# Patient Record
Sex: FEMALE | Race: BLACK OR AFRICAN AMERICAN | NOT HISPANIC OR LATINO | Employment: FULL TIME | URBAN - METROPOLITAN AREA
[De-identification: names, ages, dates, MRNs, and addresses within clinical notes are randomized per-mention and may not be internally consistent; named-entity substitution may affect disease eponyms.]

---

## 2021-01-18 ENCOUNTER — TRANSCRIBE ORDERS (OUTPATIENT)
Dept: LAB | Facility: HOSPITAL | Age: 26
End: 2021-01-18

## 2021-01-18 ENCOUNTER — LAB (OUTPATIENT)
Dept: LAB | Facility: HOSPITAL | Age: 26
End: 2021-01-18

## 2021-01-18 DIAGNOSIS — Z32.00 PREGNANCY EXAMINATION OR TEST, PREGNANCY UNCONFIRMED: Primary | ICD-10-CM

## 2021-01-18 DIAGNOSIS — Z32.00 PREGNANCY EXAMINATION OR TEST, PREGNANCY UNCONFIRMED: ICD-10-CM

## 2021-01-18 LAB
ALP SERPL-CCNC: 63 U/L (ref 39–117)
ALT SERPL W P-5'-P-CCNC: 13 U/L (ref 1–33)
AST SERPL-CCNC: 21 U/L (ref 1–32)
BILIRUB SERPL-MCNC: 0.3 MG/DL (ref 0–1.2)
CREAT SERPL-MCNC: 0.69 MG/DL (ref 0.57–1)
HCG INTACT+B SERPL-ACNC: NORMAL MIU/ML
LDH SERPL-CCNC: 194 U/L (ref 135–214)
PROGEST SERPL-MCNC: 12.7 NG/ML
URATE SERPL-MCNC: 3.2 MG/DL (ref 2.4–5.7)

## 2021-01-18 PROCEDURE — 84460 ALANINE AMINO (ALT) (SGPT): CPT

## 2021-01-18 PROCEDURE — 82247 BILIRUBIN TOTAL: CPT

## 2021-01-18 PROCEDURE — 84702 CHORIONIC GONADOTROPIN TEST: CPT

## 2021-01-18 PROCEDURE — 82565 ASSAY OF CREATININE: CPT

## 2021-01-18 PROCEDURE — 36415 COLL VENOUS BLD VENIPUNCTURE: CPT

## 2021-01-18 PROCEDURE — 83615 LACTATE (LD) (LDH) ENZYME: CPT

## 2021-01-18 PROCEDURE — 84550 ASSAY OF BLOOD/URIC ACID: CPT

## 2021-01-18 PROCEDURE — 84075 ASSAY ALKALINE PHOSPHATASE: CPT

## 2021-01-18 PROCEDURE — 84450 TRANSFERASE (AST) (SGOT): CPT

## 2021-01-18 PROCEDURE — 84144 ASSAY OF PROGESTERONE: CPT

## 2021-02-12 ENCOUNTER — LAB (OUTPATIENT)
Dept: LAB | Facility: HOSPITAL | Age: 26
End: 2021-02-12

## 2021-02-12 ENCOUNTER — TRANSCRIBE ORDERS (OUTPATIENT)
Dept: LAB | Facility: HOSPITAL | Age: 26
End: 2021-02-12

## 2021-02-12 DIAGNOSIS — Z34.81 PRENATAL CARE, SUBSEQUENT PREGNANCY, FIRST TRIMESTER: ICD-10-CM

## 2021-02-12 DIAGNOSIS — Z3A.11 11 WEEKS GESTATION OF PREGNANCY: ICD-10-CM

## 2021-02-12 DIAGNOSIS — Z3A.11 11 WEEKS GESTATION OF PREGNANCY: Primary | ICD-10-CM

## 2021-02-12 LAB
ABO GROUP BLD: NORMAL
AMPHET+METHAMPHET UR QL: NEGATIVE
AMPHETAMINES UR QL: NEGATIVE
BARBITURATES UR QL SCN: NEGATIVE
BASOPHILS # BLD AUTO: 0.04 10*3/MM3 (ref 0–0.2)
BASOPHILS NFR BLD AUTO: 0.4 % (ref 0–1.5)
BENZODIAZ UR QL SCN: NEGATIVE
BILIRUB UR QL STRIP: NEGATIVE
BLD GP AB SCN SERPL QL: NEGATIVE
BUPRENORPHINE SERPL-MCNC: NEGATIVE NG/ML
CANNABINOIDS SERPL QL: POSITIVE
CLARITY UR: ABNORMAL
COCAINE UR QL: NEGATIVE
COLOR UR: YELLOW
DEPRECATED RDW RBC AUTO: 45.5 FL (ref 37–54)
EOSINOPHIL # BLD AUTO: 0.05 10*3/MM3 (ref 0–0.4)
EOSINOPHIL NFR BLD AUTO: 0.5 % (ref 0.3–6.2)
ERYTHROCYTE [DISTWIDTH] IN BLOOD BY AUTOMATED COUNT: 13.8 % (ref 12.3–15.4)
GLUCOSE SERPL-MCNC: 77 MG/DL (ref 65–99)
GLUCOSE UR STRIP-MCNC: NEGATIVE MG/DL
HBV SURFACE AG SERPL QL IA: NORMAL
HCT VFR BLD AUTO: 39.8 % (ref 34–46.6)
HCV AB SER DONR QL: NORMAL
HGB BLD-MCNC: 12.7 G/DL (ref 12–15.9)
HGB UR QL STRIP.AUTO: NEGATIVE
HIV1+2 AB SER QL: NORMAL
IMM GRANULOCYTES # BLD AUTO: 0.03 10*3/MM3 (ref 0–0.05)
IMM GRANULOCYTES NFR BLD AUTO: 0.3 % (ref 0–0.5)
KETONES UR QL STRIP: ABNORMAL
LEUKOCYTE ESTERASE UR QL STRIP.AUTO: NEGATIVE
LYMPHOCYTES # BLD AUTO: 1.68 10*3/MM3 (ref 0.7–3.1)
LYMPHOCYTES NFR BLD AUTO: 18.1 % (ref 19.6–45.3)
MCH RBC QN AUTO: 28.8 PG (ref 26.6–33)
MCHC RBC AUTO-ENTMCNC: 31.9 G/DL (ref 31.5–35.7)
MCV RBC AUTO: 90.2 FL (ref 79–97)
METHADONE UR QL SCN: NEGATIVE
MONOCYTES # BLD AUTO: 0.64 10*3/MM3 (ref 0.1–0.9)
MONOCYTES NFR BLD AUTO: 6.9 % (ref 5–12)
NEUTROPHILS NFR BLD AUTO: 6.83 10*3/MM3 (ref 1.7–7)
NEUTROPHILS NFR BLD AUTO: 73.8 % (ref 42.7–76)
NITRITE UR QL STRIP: NEGATIVE
NRBC BLD AUTO-RTO: 0 /100 WBC (ref 0–0.2)
OPIATES UR QL: NEGATIVE
OXYCODONE UR QL SCN: NEGATIVE
PCP UR QL SCN: NEGATIVE
PH UR STRIP.AUTO: 7.5 [PH] (ref 5–8)
PLATELET # BLD AUTO: 328 10*3/MM3 (ref 140–450)
PMV BLD AUTO: 9.8 FL (ref 6–12)
PROPOXYPH UR QL: NEGATIVE
PROT UR QL STRIP: ABNORMAL
RBC # BLD AUTO: 4.41 10*6/MM3 (ref 3.77–5.28)
RH BLD: POSITIVE
RPR SER QL: NORMAL
RUBV IGG SERPL IA-ACNC: POSITIVE
SP GR UR STRIP: 1.03 (ref 1–1.03)
TRICYCLICS UR QL SCN: NEGATIVE
TSH SERPL DL<=0.05 MIU/L-ACNC: 0.45 UIU/ML (ref 0.27–4.2)
UROBILINOGEN UR QL STRIP: ABNORMAL
WBC # BLD AUTO: 9.27 10*3/MM3 (ref 3.4–10.8)

## 2021-02-12 PROCEDURE — 80081 OBSTETRIC PANEL INC HIV TSTG: CPT

## 2021-02-12 PROCEDURE — 81003 URINALYSIS AUTO W/O SCOPE: CPT

## 2021-02-12 PROCEDURE — 80306 DRUG TEST PRSMV INSTRMNT: CPT

## 2021-02-12 PROCEDURE — 82947 ASSAY GLUCOSE BLOOD QUANT: CPT

## 2021-02-12 PROCEDURE — 86803 HEPATITIS C AB TEST: CPT

## 2021-02-12 PROCEDURE — 36415 COLL VENOUS BLD VENIPUNCTURE: CPT

## 2021-02-12 PROCEDURE — 84443 ASSAY THYROID STIM HORMONE: CPT

## 2021-04-29 ENCOUNTER — HOSPITAL ENCOUNTER (OUTPATIENT)
Facility: HOSPITAL | Age: 26
Discharge: HOME | End: 2021-04-29
Attending: OBSTETRICS & GYNECOLOGY | Admitting: OBSTETRICS & GYNECOLOGY

## 2021-04-29 ENCOUNTER — HOSPITAL ENCOUNTER (OUTPATIENT)
Facility: HOSPITAL | Age: 26
Setting detail: SURGERY ADMIT
End: 2021-04-29
Attending: OBSTETRICS & GYNECOLOGY | Admitting: OBSTETRICS & GYNECOLOGY

## 2021-04-29 VITALS
SYSTOLIC BLOOD PRESSURE: 153 MMHG | WEIGHT: 174 LBS | RESPIRATION RATE: 16 BRPM | DIASTOLIC BLOOD PRESSURE: 94 MMHG | BODY MASS INDEX: 30.83 KG/M2 | HEIGHT: 63 IN | HEART RATE: 89 BPM

## 2021-04-29 PROCEDURE — 99203 OFFICE O/P NEW LOW 30 MIN: CPT | Performed by: OBSTETRICS & GYNECOLOGY

## 2021-04-29 PROCEDURE — G0463 HOSPITAL OUTPT CLINIC VISIT: HCPCS

## 2021-04-29 RX ORDER — ASPIRIN 81 MG/1
81 TABLET, CHEWABLE ORAL DAILY
COMMUNITY
End: 2022-04-11

## 2021-04-29 RX ORDER — PRENATAL WITH FERROUS FUM AND FOLIC ACID 3080; 920; 120; 400; 22; 1.84; 3; 20; 10; 1; 12; 200; 27; 25; 2 [IU]/1; [IU]/1; MG/1; [IU]/1; MG/1; MG/1; MG/1; MG/1; MG/1; MG/1; UG/1; MG/1; MG/1; MG/1; MG/1
TABLET ORAL DAILY
COMMUNITY
End: 2022-04-11

## 2021-08-23 ENCOUNTER — PREP FOR SURGERY (OUTPATIENT)
Dept: OTHER | Facility: HOSPITAL | Age: 26
End: 2021-08-23

## 2021-08-23 DIAGNOSIS — Z34.90 TERM PREGNANCY: Primary | ICD-10-CM

## 2021-08-23 RX ORDER — CEFAZOLIN SODIUM 2 G/100ML
2 INJECTION, SOLUTION INTRAVENOUS ONCE
Status: CANCELLED | OUTPATIENT
Start: 2021-08-23 | End: 2021-08-23

## 2021-08-23 RX ORDER — OXYTOCIN-SODIUM CHLORIDE 0.9% IV SOLN 30 UNIT/500ML 30-0.9/5 UT/ML-%
85 SOLUTION INTRAVENOUS ONCE
Status: CANCELLED | OUTPATIENT
Start: 2021-08-23 | End: 2021-08-23

## 2021-08-23 RX ORDER — SODIUM CHLORIDE 0.9 % (FLUSH) 0.9 %
3-10 SYRINGE (ML) INJECTION AS NEEDED
Status: CANCELLED | OUTPATIENT
Start: 2021-08-23

## 2021-08-23 RX ORDER — CARBOPROST TROMETHAMINE 250 UG/ML
250 INJECTION, SOLUTION INTRAMUSCULAR AS NEEDED
Status: CANCELLED | OUTPATIENT
Start: 2021-08-23

## 2021-08-23 RX ORDER — KETOROLAC TROMETHAMINE 30 MG/ML
30 INJECTION, SOLUTION INTRAMUSCULAR; INTRAVENOUS ONCE
Status: CANCELLED | OUTPATIENT
Start: 2021-08-23 | End: 2021-08-23

## 2021-08-23 RX ORDER — SODIUM CHLORIDE, SODIUM LACTATE, POTASSIUM CHLORIDE, CALCIUM CHLORIDE 600; 310; 30; 20 MG/100ML; MG/100ML; MG/100ML; MG/100ML
125 INJECTION, SOLUTION INTRAVENOUS CONTINUOUS
Status: CANCELLED | OUTPATIENT
Start: 2021-08-23

## 2021-08-23 RX ORDER — ACETAMINOPHEN 500 MG
1000 TABLET ORAL ONCE
Status: CANCELLED | OUTPATIENT
Start: 2021-08-23 | End: 2021-08-23

## 2021-08-23 RX ORDER — OXYTOCIN-SODIUM CHLORIDE 0.9% IV SOLN 30 UNIT/500ML 30-0.9/5 UT/ML-%
650 SOLUTION INTRAVENOUS ONCE
Status: CANCELLED | OUTPATIENT
Start: 2021-08-23 | End: 2021-08-23

## 2021-08-23 RX ORDER — SODIUM CHLORIDE 0.9 % (FLUSH) 0.9 %
3 SYRINGE (ML) INJECTION EVERY 12 HOURS SCHEDULED
Status: CANCELLED | OUTPATIENT
Start: 2021-08-23

## 2021-08-23 RX ORDER — MISOPROSTOL 200 UG/1
800 TABLET ORAL AS NEEDED
Status: CANCELLED | OUTPATIENT
Start: 2021-08-23

## 2021-08-23 RX ORDER — TRISODIUM CITRATE DIHYDRATE AND CITRIC ACID MONOHYDRATE 500; 334 MG/5ML; MG/5ML
30 SOLUTION ORAL ONCE
Status: CANCELLED | OUTPATIENT
Start: 2021-08-23 | End: 2021-08-23

## 2021-08-23 RX ORDER — ONDANSETRON 4 MG/1
4 TABLET, FILM COATED ORAL EVERY 6 HOURS PRN
Status: CANCELLED | OUTPATIENT
Start: 2021-08-23

## 2021-08-23 RX ORDER — ONDANSETRON 2 MG/ML
4 INJECTION INTRAMUSCULAR; INTRAVENOUS EVERY 6 HOURS PRN
Status: CANCELLED | OUTPATIENT
Start: 2021-08-23

## 2021-08-23 RX ORDER — LIDOCAINE HYDROCHLORIDE 10 MG/ML
5 INJECTION, SOLUTION EPIDURAL; INFILTRATION; INTRACAUDAL; PERINEURAL AS NEEDED
Status: CANCELLED | OUTPATIENT
Start: 2021-08-23

## 2021-08-23 RX ORDER — OXYCODONE HYDROCHLORIDE AND ACETAMINOPHEN 5; 325 MG/1; MG/1
1 TABLET ORAL EVERY 4 HOURS PRN
Status: CANCELLED | OUTPATIENT
Start: 2021-08-23 | End: 2021-09-02

## 2021-08-23 RX ORDER — METHYLERGONOVINE MALEATE 0.2 MG/ML
200 INJECTION INTRAVENOUS ONCE AS NEEDED
Status: CANCELLED | OUTPATIENT
Start: 2021-08-23

## 2021-08-24 ENCOUNTER — APPOINTMENT (OUTPATIENT)
Dept: PREADMISSION TESTING | Facility: HOSPITAL | Age: 26
End: 2021-08-24

## 2021-09-05 PROCEDURE — U0004 COV-19 TEST NON-CDC HGH THRU: HCPCS | Performed by: NURSE PRACTITIONER

## 2021-10-04 PROCEDURE — U0004 COV-19 TEST NON-CDC HGH THRU: HCPCS | Performed by: FAMILY MEDICINE

## 2021-10-08 ENCOUNTER — HOSPITAL ENCOUNTER (EMERGENCY)
Facility: HOSPITAL | Age: 26
Discharge: LEFT BEFORE DISCHARGE | End: 2021-10-08

## 2021-10-08 VITALS
OXYGEN SATURATION: 99 % | WEIGHT: 190 LBS | DIASTOLIC BLOOD PRESSURE: 106 MMHG | BODY MASS INDEX: 34.96 KG/M2 | TEMPERATURE: 98.6 F | RESPIRATION RATE: 16 BRPM | SYSTOLIC BLOOD PRESSURE: 150 MMHG | HEIGHT: 62 IN | HEART RATE: 130 BPM

## 2021-10-08 PROCEDURE — 99211 OFF/OP EST MAY X REQ PHY/QHP: CPT

## 2022-04-11 ENCOUNTER — OFFICE VISIT (OUTPATIENT)
Dept: INTERNAL MEDICINE | Facility: CLINIC | Age: 27
End: 2022-04-11

## 2022-04-11 VITALS
TEMPERATURE: 97.6 F | HEART RATE: 74 BPM | WEIGHT: 186 LBS | RESPIRATION RATE: 17 BRPM | SYSTOLIC BLOOD PRESSURE: 124 MMHG | BODY MASS INDEX: 32.96 KG/M2 | DIASTOLIC BLOOD PRESSURE: 80 MMHG | HEIGHT: 63 IN

## 2022-04-11 DIAGNOSIS — Z13.220 LIPID SCREENING: ICD-10-CM

## 2022-04-11 DIAGNOSIS — I10 PRIMARY HYPERTENSION: Primary | ICD-10-CM

## 2022-04-11 PROCEDURE — 99204 OFFICE O/P NEW MOD 45 MIN: CPT | Performed by: INTERNAL MEDICINE

## 2022-04-11 NOTE — PROGRESS NOTES
"Chief Complaint  Establish Care    Subjective    Virginia Pandey is a 26 y.o. female.     Virginia Pandey presents to [facility] for       History of Present Illness    The following portions of the patient's history were reviewed and updated as appropriate: allergies, current medications, past family history, past medical history, past social history, past surgical history and problem list.    Establishment     1 HTN- chronic and controlled.  Patient denies any shortness of breath, chest pain, nausea, vomiting, headache and fatigue, or any other systemic symptoms.  She says that she has been compliant with her blood pressure medications and has had no issues.  She does have questions and concerns in regards to how to manage her medications in regards to her blood pressure        Past Medical History   Pre eclempsia - Patient was treated with Labelalol throughout her pregnancy    Hydradenitis Suppurativa- treated with Humara       Family History   Hypertension       Social History: No EtOH consumption, no IV drug use, no marijuana, no illicit drugs.        Review of Systems   All other systems reviewed and are negative.      Objective   Vital Signs:   /80   Pulse 74   Temp 97.6 °F (36.4 °C)   Resp 17   Ht 160 cm (63\")   Wt 84.4 kg (186 lb)   BMI 32.95 kg/m²     Body mass index is 32.95 kg/m².    Physical Exam  Vitals and nursing note reviewed.   Constitutional:       Appearance: Normal appearance. She is normal weight.   HENT:      Head: Normocephalic and atraumatic.      Right Ear: Tympanic membrane, ear canal and external ear normal.      Left Ear: Tympanic membrane, ear canal and external ear normal.      Nose: Nose normal.      Mouth/Throat:      Mouth: Mucous membranes are moist.   Eyes:      Pupils: Pupils are equal, round, and reactive to light.   Cardiovascular:      Rate and Rhythm: Normal rate.      Pulses: Normal pulses.   Pulmonary:      Effort: " Pulmonary effort is normal.   Musculoskeletal:      Cervical back: Normal range of motion and neck supple.   Skin:     General: Skin is warm.      Capillary Refill: Capillary refill takes less than 2 seconds.   Neurological:      General: No focal deficit present.      Mental Status: She is alert.   Psychiatric:         Mood and Affect: Mood normal.         Behavior: Behavior normal.         Thought Content: Thought content normal.         Judgment: Judgment normal.               Assessment and Plan  Diagnoses and all orders for this visit:    Diagnoses and all orders for this visit:    1. Primary hypertension (Primary)  -     CBC (No Diff); Future  -     Comprehensive Metabolic Panel; Future  -     T4, Free; Future  -     TSH; Future    Patient has been off of her spironolactone 50 mg 1 tablet by mouth twice a day-provided blood pressure log sheet for her and we will continue to monitor blood pressures with recording readings for me to review    2. Lipid screening  -     Lipid Panel; Future

## 2022-06-08 ENCOUNTER — OFFICE VISIT (OUTPATIENT)
Dept: INTERNAL MEDICINE | Facility: CLINIC | Age: 27
End: 2022-06-08

## 2022-06-08 VITALS
DIASTOLIC BLOOD PRESSURE: 80 MMHG | TEMPERATURE: 98.7 F | HEART RATE: 70 BPM | SYSTOLIC BLOOD PRESSURE: 120 MMHG | RESPIRATION RATE: 20 BRPM | OXYGEN SATURATION: 99 % | BODY MASS INDEX: 31.89 KG/M2 | WEIGHT: 180 LBS

## 2022-06-08 DIAGNOSIS — B00.9 HSV INFECTION: ICD-10-CM

## 2022-06-08 DIAGNOSIS — K13.0 CHEILITIS: Primary | ICD-10-CM

## 2022-06-08 PROCEDURE — 99214 OFFICE O/P EST MOD 30 MIN: CPT | Performed by: INTERNAL MEDICINE

## 2022-06-08 RX ORDER — ACYCLOVIR 400 MG/1
400 TABLET ORAL 3 TIMES DAILY
Qty: 15 TABLET | Refills: 1 | OUTPATIENT
Start: 2022-06-08 | End: 2023-01-18

## 2022-06-08 NOTE — PROGRESS NOTES
Chief Complaint  Rash    Subjective    Virginia Pandey is a 26 y.o. female.     Virginia Pandey presents to [facility] for       History of Present Illness    The patient first noted that her glands were swollen on 05/28/2022. The rash has now spread all the way to her eye. The patient wonders if the rash could have been caused by her Humira. Her third injection of Humira was on 05/26/2022. The patient notes that the rash started out as what she thought was a hidradenitis suppurativa, but after 2 weeks, the hidradenitis suppurativa kept enlarging. When the hidradenitis suppurativa started reducing was when the blotchy rash appeared on her face. She denies any pain with the lesion, she just experiences a hot sensation. She does experience pruritis with the rash occasionally.  The patient tries not to scratch it, but sometimes she can not help it. She notes that she was told that the Humira weakened her immune system. She denies utilizing any topical solutions. The patient denies any fever, chills or influenza-like symptoms; however, she does admit to a scratchy throat. 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 She states that she has experienced hidradenitis suppurativa since she was a child. She administers Humira every 14 days, and her 4th dose is scheduled for tomorrow. She reports that she has not had a flare up since starting the Humira. The patient notes that she has some good days and some bad days.    The following portions of the patient's history were reviewed and updated as appropriate: allergies, current medications, past family history, past medical history, past social history, past surgical history and problem list.    Review of Systems   A review of systems was performed, and the pertinent positives are noted in the HPI.      Objective   Vital Signs:   /80 (BP Location: Right arm,  Patient Position: Sitting, Cuff Size: Adult)   Pulse 70   Temp 98.7 °F (37.1 °C) (Temporal)   Resp 20   Wt 81.6 kg (180 lb)   SpO2 99%   BMI 31.89 kg/m²     Body mass index is 31.89 kg/m².    Physical Exam  Constitutional:       Appearance: Normal appearance.      Comments: Very focused physical with the patient with focus on the skin.   HENT:      Head: Normocephalic and atraumatic.      Mouth/Throat:      Mouth: Mucous membranes are moist.      Pharynx: No oropharyngeal exudate or posterior oropharyngeal erythema.      Comments: On the mouth, she does have a small angular cheilitis in the medial aspect of her right angular area on her mouth with diffuse papular rash on the face with one small ulcerated lesion in the nasal nare on the right side. She also has a small palpable lymph node on the right side as well, nontender.  Eyes:      Extraocular Movements: Extraocular movements intact.      Pupils: Pupils are equal, round, and reactive to light.   Neurological:      Mental Status: She is alert.               Assessment and Plan  Diagnoses and all orders for this visit:    Spent 35 minutes face-to-face with patient    Cheilitis/HSV infection.  I have recommended that she hold her Humira infusion therapy for the treatment of her hidradenitis suppurativa. The fact that this could possibly put her in immunosuppressed state and cause the infection to spread, so we are going to hold off on the Humira for approximately 1 week. I am going to start her on acyclovir 400 mg 3 times a day for 5 days with a refill just in case we have to repeat. Patient will contact me after treatment to let me know how her symptoms are doing in correlation with her rash to assess resolution or worsening of the rash. At that time, we will make a decision.            Follow Up   No follow-ups on file.  Patient was given instructions and counseling regarding her condition or for health maintenance advice. Please see specific information  pulled into the AVS if appropriate.    Transcribed from ambient dictation for James Velez MD by ALLYSON PORRAS.  06/08/22   17:42 EDT    Patient verbalized consent to the visit recording.  I have personally performed the services described in this document as transcribed by the above individual, and it is both accurate and complete.  James Velez MD  6/14/2022  21:26 EDT

## 2022-11-07 ENCOUNTER — PATIENT MESSAGE (OUTPATIENT)
Dept: INTERNAL MEDICINE | Facility: CLINIC | Age: 27
End: 2022-11-07

## 2023-01-18 PROCEDURE — 87077 CULTURE AEROBIC IDENTIFY: CPT | Performed by: FAMILY MEDICINE

## 2023-01-18 PROCEDURE — 87086 URINE CULTURE/COLONY COUNT: CPT | Performed by: FAMILY MEDICINE

## 2023-01-18 PROCEDURE — 87186 SC STD MICRODIL/AGAR DIL: CPT | Performed by: FAMILY MEDICINE

## 2023-03-08 ENCOUNTER — PATIENT MESSAGE (OUTPATIENT)
Dept: INTERNAL MEDICINE | Facility: CLINIC | Age: 28
End: 2023-03-08
Payer: COMMERCIAL

## 2023-03-09 NOTE — TELEPHONE ENCOUNTER
From: Virginia Pandey  To: James Velez  Sent: 3/8/2023 5:35 PM EST  Subject: Lorena Troncoso I’m at the pharmacy and they said they don’t have a prescription or anything for her and I’ve called the other two [facility] and they have all said they have nothing.

## 2023-03-26 ENCOUNTER — PATIENT MESSAGE (OUTPATIENT)
Dept: INTERNAL MEDICINE | Facility: CLINIC | Age: 28
End: 2023-03-26
Payer: COMMERCIAL

## 2023-03-27 NOTE — TELEPHONE ENCOUNTER
From: Virginia Pandey  To: James Velez  Sent: 3/26/2023 9:43 PM EDT  Subject: Lorena Pandeyananda [birth year]    Aba Robbins are you?   I was wondering could Lorena be worked in tomorrow it is URGENT, she has still had a really nasty cough even after antibiotics and she struggles with the cough its horrible. Im worried I know mama said you would run prior test on her if needed. Please let me know thank you!

## 2023-12-15 ENCOUNTER — HOSPITAL ENCOUNTER (EMERGENCY)
Facility: HOSPITAL | Age: 28
Discharge: HOME | End: 2023-12-15
Attending: EMERGENCY MEDICINE
Payer: COMMERCIAL

## 2023-12-15 VITALS
RESPIRATION RATE: 18 BRPM | DIASTOLIC BLOOD PRESSURE: 108 MMHG | OXYGEN SATURATION: 98 % | HEART RATE: 115 BPM | TEMPERATURE: 98.1 F | WEIGHT: 192 LBS | BODY MASS INDEX: 34.02 KG/M2 | HEIGHT: 63 IN | SYSTOLIC BLOOD PRESSURE: 119 MMHG

## 2023-12-15 DIAGNOSIS — K02.9 PAIN DUE TO DENTAL CARIES: Primary | ICD-10-CM

## 2023-12-15 PROCEDURE — 99282 EMERGENCY DEPT VISIT SF MDM: CPT

## 2023-12-15 RX ORDER — PENICILLIN V POTASSIUM 500 MG/1
500 TABLET ORAL 4 TIMES DAILY
Qty: 40 TABLET | Refills: 0 | Status: SHIPPED | OUTPATIENT
Start: 2023-12-15

## 2023-12-15 NOTE — ED PROVIDER NOTES
Subjective   History of Present Illness  28-year-old female presents emergency department today with dental pain.  She complains of the last lower molar on the left causing pain.  She has a dentist who has been trying to save this tooth but apparently has gotten worse.  She reports she has had no fevers no chills but having pain up into the ear.  No drainage from the ear no hearing loss.  Her dentist apparently now cannot get her in to fix this.    History provided by:  Patient   used: No    Dental Pain  Location:  Lower  Lower teeth location:  32/RL 3rd molar  Quality:  Throbbing and dull  Severity:  Severe  Onset quality:  Gradual  Duration:  2 days  Timing:  Constant  Progression:  Worsening  Chronicity:  New  Context: abscess    Previous work-up:  Dental exam  Relieved by:  Nothing  Worsened by:  Nothing  Ineffective treatments:  None tried  Associated symptoms: no congestion, no drooling, no facial pain, no facial swelling, no fever, no gum swelling, no neck pain, no oral lesions and no trismus    Risk factors: no alcohol problem, no diabetes, no immunosuppression and sufficient dental care        Review of Systems   Constitutional:  Negative for chills and fever.   HENT:  Negative for congestion, drooling, facial swelling and mouth sores.    Respiratory:  Negative for chest tightness, shortness of breath and wheezing.    Cardiovascular:  Negative for chest pain and palpitations.   Musculoskeletal:  Negative for neck pain.   Psychiatric/Behavioral: Negative.         Past Medical History:   Diagnosis Date    Hidradenitis suppurativa     Hydradenitis     Hypertension     [place] teeth extracted        No Known Allergies    Past Surgical History:   Procedure Laterality Date     SECTION         Family History   Problem Relation Age of Onset    Asthma Mother        Social History     Socioeconomic History    Marital status: Single   Tobacco Use    Smoking status: Never    Smokeless  "tobacco: Never   Vaping Use    Vaping Use: Never used   Substance and Sexual Activity    Alcohol use: Yes     Comment: Daily    Drug use: Defer    Sexual activity: Defer           Objective   Physical Exam  Vitals and nursing note reviewed.   Constitutional:       General: She is not in acute distress.     Appearance: She is well-developed. She is not diaphoretic.   HENT:      Head: Normocephalic and atraumatic.      Nose: Nose normal.      Mouth/Throat:      Comments: 32nd tooth left lower jaw tender to touch.  Caries noted.  No gel edema no facial edema no redness.  Eyes:      General: No scleral icterus.     Conjunctiva/sclera: Conjunctivae normal.   Pulmonary:      Effort: Pulmonary effort is normal. No respiratory distress.   Musculoskeletal:         General: Normal range of motion.      Cervical back: Normal range of motion and neck supple.   Skin:     General: Skin is warm and dry.   Neurological:      Mental Status: She is alert and oriented to person, place, and time.   Psychiatric:         Behavior: Behavior normal.         Procedures           ED Course                                   No results found for this or any previous visit (from the past 24 hour(s)).  Note: In addition to lab results from this visit, the labs listed above may include labs taken at another facility or during a different encounter within the last 24 hours. Please correlate lab times with ED admission and discharge times for further clarification of the services performed during this visit.    No orders to display     Vitals:    12/15/23 1632   BP: (!) 119/108   BP Location: Left arm   Patient Position: Sitting   Pulse: 115   Resp: 18   Temp: 98.1 °F (36.7 °C)   TempSrc: Axillary   SpO2: 98%   Weight: 87.1 kg (192 lb)   Height: 160 cm (63\")     Medications - No data to display  ECG/EMG Results (last 24 hours)       ** No results found for the last 24 hours. **          No orders to display                 Medical Decision " Making  Problems Addressed:  Pain due to dental caries: complicated acute illness or injury    Risk  Prescription drug management.        Final diagnoses:   Pain due to dental caries       ED Disposition  ED Disposition       ED Disposition   Discharge    Condition   Stable    Comment   --                DENTAL CLINIC  [address] [place] [address]  [phone]             Medication List        New Prescriptions      diclofenac 50 MG EC tablet  Commonly known as: VOLTAREN  Take 1 tablet by mouth 3 (Three) Times a Day.     penicillin v potassium 500 MG tablet  Commonly known as: VEETID  Take 1 tablet by mouth 4 (Four) Times a Day.            Stop      sulfamethoxazole-trimethoprim 800-160 MG per tablet  Commonly known as: BACTRIM DS,SEPTRA DS               Where to Get Your Medications        These medications were sent to [facility] [address] [place] [address] - [address] [place] [address] - [phone]  - [phone] [place] [address]      Phone: [phone]   diclofenac 50 MG EC tablet  penicillin v potassium 500 MG tablet            Aj Tillman PA  12/17/23 0808

## 2023-12-21 ENCOUNTER — OFFICE VISIT (OUTPATIENT)
Dept: INTERNAL MEDICINE | Facility: CLINIC | Age: 28
End: 2023-12-21
Payer: COMMERCIAL

## 2023-12-21 VITALS
DIASTOLIC BLOOD PRESSURE: 76 MMHG | TEMPERATURE: 97.8 F | HEART RATE: 98 BPM | BODY MASS INDEX: 34.19 KG/M2 | RESPIRATION RATE: 18 BRPM | SYSTOLIC BLOOD PRESSURE: 118 MMHG | WEIGHT: 193 LBS

## 2023-12-21 DIAGNOSIS — L73.2 HIDRADENITIS SUPPURATIVA: Primary | ICD-10-CM

## 2023-12-21 PROCEDURE — 99214 OFFICE O/P EST MOD 30 MIN: CPT | Performed by: INTERNAL MEDICINE

## 2023-12-21 RX ORDER — AMOXICILLIN AND CLAVULANATE POTASSIUM 875; 125 MG/1; MG/1
1 TABLET, FILM COATED ORAL 2 TIMES DAILY
COMMUNITY
Start: 2023-12-21 | End: 2023-12-28

## 2023-12-21 RX ORDER — IBUPROFEN 800 MG/1
1 TABLET ORAL EVERY 6 HOURS
COMMUNITY
Start: 2023-09-25

## 2023-12-22 NOTE — PROGRESS NOTES
Chief Complaint  Skin Problem    Subjective    Virginia Pandey is a 28 y.o. female.     Virginia Pandey presents to [facility] for       History of Present Illness    Chief Complete: Skin problem.    1. Hidradenitis suppurativa. - The patient has been receiving Humira every 14 days. She was under the impression that she was going to have 6-month appointments, but she ended up missing that appointment. When she called to reschedule, they told her that they could not reschedule her. She was told that there was nothing they could do. She was given a list of some dermatologists to call, but no one takes her insurance. She notes that she called her insurance company and told her that primary care doctors are controlled. She has her last injection at home in the refrigerator currently.    2. Dental work  The patient underwent failed dental work today, 12/21/2023. They tried to pull a tooth for 2 hours and were unsuccessful.        The following portions of the patient's history were reviewed and updated as appropriate: allergies, current medications, past family history, past medical history, past social history, past surgical history, and problem list.    Review of Systems  A review of systems was performed, and the pertinent positives are noted in the HPI.      Objective   Vital Signs:   /76 (BP Location: Right arm, Patient Position: Sitting, Cuff Size: Adult)   Pulse 98   Temp 97.8 °F (36.6 °C) (Temporal)   Resp 18   Wt 87.5 kg (193 lb)   BMI 34.19 kg/m²     Body mass index is 34.19 kg/m².  BMI is >= 30 and <35. (Class 1 Obesity). The following options were offered after discussion;: weight loss educational material (shared in after visit summary) and exercise counseling/recommendations     Physical Exam     General: Patient is alert x3, non-distressed.  HEENT: Head is normocephalic, atraumatic. Pupils equal to light and accommodation. Extraocular  muscles intact. Moist membranes. Neck was supple. No cervical lymphadenopathy. No goiter.     Assessment and Plan  Diagnoses and all orders for this visit:    1. Hidradenitis suppurativa  The patient is here today because her insurance, [employer], is not covered by any of the dermatologists here in [place]. The patient currently receives an injection of Humira every 14 days. The patient is taking Humira for hidradenitis suppurativa, which has been doing very well for her. Currently, she was at [facility] and missed one appointment because of lack of transportation, and then there was a lack of communication on her next follow-up appointment, which caused her to be discharged from the practice because of their policy. The patient would like to know if I, as her primary care doctor, could write the Humira. Obviously writing for biologicals entertains and opens up other protocol that must be followed. I am recommending that the dermatologist prescribe this medication. I will look into [facility] to see if they can go ahead and see her under the circumstances. I told the patient that there is no guarantee that this could be entertained, and it would still be up to [facility]'s protocol. I will let the patient know here in the next week the standing.    Follow Up   No follow-ups on file.  Patient was given instructions and counseling regarding her condition or for health maintenance advice. Please see specific information pulled into the AVS if appropriate.     Transcribed from ambient dictation for James Velez MD by Louisa Faust.  12/21/23   21:10 EST    Patient or patient representative verbalized consent to the visit recording.  I have personally performed the services described in this document as transcribed by the above individual, and it is both accurate and complete.

## 2023-12-23 ENCOUNTER — TELEPHONE (OUTPATIENT)
Dept: INTERNAL MEDICINE | Facility: CLINIC | Age: 28
End: 2023-12-23
Payer: COMMERCIAL

## 2023-12-23 NOTE — TELEPHONE ENCOUNTER
Please tell patient I spoke to Luis Britt ( someone from his office should have called her with an appointment)    He was able to talk to his clinic manager and excuse one of her unexcused absences.     He has also called in more Humira medication for her.    She should have received an appointment follow up.    Let me know if she has any other issues.

## 2024-04-26 ENCOUNTER — OFFICE VISIT (OUTPATIENT)
Dept: INTERNAL MEDICINE | Facility: CLINIC | Age: 29
End: 2024-04-26
Payer: COMMERCIAL

## 2024-04-26 VITALS
BODY MASS INDEX: 33.21 KG/M2 | TEMPERATURE: 97.7 F | RESPIRATION RATE: 18 BRPM | WEIGHT: 187.5 LBS | HEART RATE: 104 BPM | SYSTOLIC BLOOD PRESSURE: 145 MMHG | DIASTOLIC BLOOD PRESSURE: 96 MMHG

## 2024-04-26 DIAGNOSIS — F43.21 GRIEF: ICD-10-CM

## 2024-04-26 DIAGNOSIS — M54.9 UPPER BACK PAIN: Primary | ICD-10-CM

## 2024-04-26 DIAGNOSIS — R03.0 ELEVATED BLOOD PRESSURE READING: ICD-10-CM

## 2024-04-26 NOTE — PROGRESS NOTES
Chief Complaint  referral (Wanting a referral for plastic surgery)    Subjective    Virginia Pandey is a 28 y.o. female.     Virginia Pandey presents to [facility] for     History of Present Illness  The patient presents with a chief complaint of wanting referral for plastic surgery.    The patient expresses a reluctance to undergo plastic surgery due to a lack of weight loss. Over the past year, her weight has fluctuated between 180 and 190 pounds. However, this year, her weight has fluctuated to around 200 pounds, fluctuating between 210 and 204 pounds. She reports an increase in abdominal size, which is the smallest she has ever weighed in months, with a recent recorded weight of 206 pounds within a week. Her weight loss goal is to reach 150 pounds. Her BMI has consistently ranged between 33 and 37. She expresses a desire for a breast reduction, which is causing her additional back issues. Her mobility, bending over, and lifting objects do not impede her back pain.    Grief- recently dealing with the death of her daughter biological father death. It has been very hard on her. No d       The following portions of the patient's history were reviewed and updated as appropriate: allergies, current medications, past family history, past medical history, past social history, past surgical history, and problem list.    Review of Systems    Objective   Vital Signs:   /96 (BP Location: Right arm, Patient Position: Sitting, Cuff Size: Adult)   Pulse 104   Temp 97.7 °F (36.5 °C) (Temporal)   Resp 18   Wt 85 kg (187 lb 8 oz)   BMI 33.21 kg/m²     Body mass index is 33.21 kg/m².          Physical Exam  The patient is mildly distressed, crying yet consoled here today, tearful.  The patient's head is normocephalic and atraumatic. Pupils are equal to light and accommodation. Extraocular muscles are intact.  The patient's chest is clear to auscultation. No  rhonchi or wheeze.  The patient's heart shows S1, S2. No murmurs, rubs, or gallops.       Results              Assessment and Plan  Diagnoses and all orders for this visit:    Spent 50 minutes face to face with patient   Assessment & Plan  1. Upper back and shoulder pain.  The patient's upper back and shoulder pain appears to be associated with her large breast size. The patient's bra size is 38 triple D, and she reports that the large breast size has contributed to her upper back pain, as well as on the sides, and discomfort when wearing clothing. The primary concern is the fatigue, muscle fatigue, and upper back pain. Given the consideration of a breast reduction, I concur that this would alleviate some of her physical symptoms and improve her quality of life.    2. Grief.  A comprehensive discussion was held with the patient, dealing with the sudden loss of her children's biological father. This situation has been particularly challenging for her, particularly as she is currently enrolled in nursing school. This is the second time she has had to attend due to grief and family issues. I recommended that she consider counseling for the grief process. The patient has preferred an -American counselor by choice, so we will attempt to enroll her into a counselor that meets her criteria. I will also consult with her nursing school directors to explore another solution to consider with her current status in conjunction with her progress through her nursing school goals. The patient will provide me with the contact information directors, and we will address that accordingly.    3 elevated blood pressure- most likely due to stress but I do want patient to check blood pressure     Follow-up  The patient is scheduled for a follow-up visit in 4 to 6 weeks to evaluate her progress in managing her grief, or sooner if necessary.               Follow Up   No follow-ups on file.  Patient was given instructions and  counseling regarding her condition or for health maintenance advice. Please see specific information pulled into the AVS if appropriate.     Patient or patient representative verbalized consent for the use of Ambient Listening during the visit with  James Velez MD for chart documentation. 4/26/2024  12:30 EDT

## 2024-09-11 ENCOUNTER — CLINICAL SUPPORT (OUTPATIENT)
Dept: INTERNAL MEDICINE | Facility: CLINIC | Age: 29
End: 2024-09-11
Payer: COMMERCIAL

## 2024-09-11 DIAGNOSIS — Z23 NEED FOR INFLUENZA VACCINATION: Primary | ICD-10-CM

## 2024-09-11 DIAGNOSIS — Z30.9 ENCOUNTER FOR CONTRACEPTIVE MANAGEMENT, UNSPECIFIED TYPE: Primary | ICD-10-CM

## 2024-09-11 PROCEDURE — 90656 IIV3 VACC NO PRSV 0.5 ML IM: CPT | Performed by: INTERNAL MEDICINE

## 2024-09-11 PROCEDURE — 90471 IMMUNIZATION ADMIN: CPT | Performed by: INTERNAL MEDICINE

## 2024-09-27 ENCOUNTER — OFFICE VISIT (OUTPATIENT)
Dept: OBSTETRICS AND GYNECOLOGY | Facility: CLINIC | Age: 29
End: 2024-09-27
Payer: COMMERCIAL

## 2024-09-27 VITALS — SYSTOLIC BLOOD PRESSURE: 164 MMHG | WEIGHT: 210 LBS | DIASTOLIC BLOOD PRESSURE: 130 MMHG | BODY MASS INDEX: 37.2 KG/M2

## 2024-09-27 DIAGNOSIS — Z30.46 NEXPLANON REMOVAL: ICD-10-CM

## 2024-09-27 DIAGNOSIS — N93.9 ABNORMAL UTERINE BLEEDING (AUB): Primary | ICD-10-CM

## 2024-10-11 ENCOUNTER — OFFICE VISIT (OUTPATIENT)
Dept: OBSTETRICS AND GYNECOLOGY | Facility: CLINIC | Age: 29
End: 2024-10-11
Payer: COMMERCIAL

## 2024-10-11 VITALS
HEIGHT: 63 IN | SYSTOLIC BLOOD PRESSURE: 124 MMHG | DIASTOLIC BLOOD PRESSURE: 80 MMHG | BODY MASS INDEX: 37.14 KG/M2 | WEIGHT: 209.6 LBS

## 2024-10-11 DIAGNOSIS — Z01.419 ENCOUNTER FOR ANNUAL ROUTINE GYNECOLOGICAL EXAMINATION: Primary | ICD-10-CM

## 2024-10-11 DIAGNOSIS — N92.1 MENORRHAGIA WITH IRREGULAR CYCLE: ICD-10-CM

## 2024-10-11 DIAGNOSIS — Z30.2 ENCOUNTER FOR STERILIZATION: ICD-10-CM

## 2024-10-11 NOTE — PROGRESS NOTES
Gynecologic Annual Exam Note        Gynecologic Exam, AUB (Follow up), TVU today, and annual        Subjective     HPI  Virginia Pandey is a 29 y.o.  female who presents for annual well woman exam as a established patient. Patient is also following up on evaluation of Abnormal Uterine Bleeding. The patient was last seen 2 weeks ago by Dean Tate MD. At that time she reported irregular menses d/t birth control and patient reports bleeding for multiple weeks at a time . She had nexplanon removed, samples of slynd were given. The plan was to follow up in 2 weeks for annual exam, follow up and U/S. Since the last visit the patient reports the plan has remained unchanged. This has been an issue in the past but was not evaluated for the issue. The patient reports additional symptoms as none.   Patient's last menstrual period was 10/09/2024 (exact date). Marital Status: single.  She is sexually active. She has not had new partners.. STD testing recommendations have been explained to the patient and she does not desire STD testing.    US performed and normal findings discussed.     The patient would like to discuss the following complaints today: heavy menstrual bleeding and desire for sterilization. Treatment options discussed including medical and surgical options.     Additional OB/GYN History   contraceptive methods: OCP (estrogen/progesterone)  Desires to: continue contraception  Thromboembolic Disease: none  History of migraines: no  Age of menarche: 13    History of STD: no    Last Pap : 2021. Results: negative. HPV: unknown . (Done at [place])  Last Completed Pap Smear       This patient has no relevant Health Maintenance data.             History of abnormal Pap smear: no  Family history of uterine, colon, breast, or ovarian cancer: yes - MGM had breast cancer  Performs monthly Self-Breast Exam: yes  Exercises Regularly:yes  Feelings of Anxiety or Depression: no  Tobacco Usage?: No        Current Outpatient Medications:     Adalimumab (Humira Pediatric Crohns Start) 80 MG/0.8ML Prefilled Syringe Kit, Inject  under the skin into the appropriate area as directed., Disp: , Rfl:     Adalimumab (HUMIRA PEN SC), Inject  under the skin into the appropriate area as directed., Disp: , Rfl:     Humira Pen 80 MG/0.8ML injection, , Disp: , Rfl:     ibuprofen (ADVIL,MOTRIN) 800 MG tablet, Take 1 tablet by mouth Every 6 (Six) Hours., Disp: , Rfl:      Patient denies the need for medication refills today.    OB History          2    Para   2    Term   1       1    AB        Living   2         SAB        IAB        Ectopic        Molar        Multiple        Live Births   2                Health Maintenance   Topic Date Due    Annual Gynecologic Pelvic and Breast Exam  Never done    ANNUAL PHYSICAL  Never done    PAP SMEAR  Never done    COVID-19 Vaccine ( season) 2024    TDAP/TD VACCINES (3 - Td or Tdap) 2024    BMI FOLLOWUP  2024    HEPATITIS C SCREENING  Completed    INFLUENZA VACCINE  Completed    Pneumococcal Vaccine 0-64  Aged Out       Past Medical History:   Diagnosis Date    Hidradenitis suppurativa     Hydradenitis     Hypertension     [place] teeth extracted         Past Surgical History:   Procedure Laterality Date     SECTION      WISDOM TOOTH EXTRACTION         The additional following portions of the patient's history were reviewed and updated as appropriate: allergies, current medications, past family history, past medical history, past social history, past surgical history, and problem list.    Review of Systems   Constitutional: Negative.    Respiratory: Negative.     Cardiovascular: Negative.    Gastrointestinal: Negative.    Genitourinary:  Positive for menstrual problem. Negative for breast discharge, breast lump, breast pain and pelvic pain.   Musculoskeletal: Negative.    Neurological: Negative.    Psychiatric/Behavioral: Negative.    "        I have reviewed and agree with the HPI, ROS, and historical information as entered above. Me          Objective   /80   Ht 160 cm (62.99\")   Wt 95.1 kg (209 lb 9.6 oz)   LMP 10/09/2024 (Exact Date)   Breastfeeding No   BMI 37.14 kg/m²     Physical Exam  Vitals reviewed. Exam conducted with a chaperone present.   Constitutional:       Appearance: Normal appearance.   HENT:      Head: Normocephalic and atraumatic.   Cardiovascular:      Rate and Rhythm: Normal rate and regular rhythm.   Pulmonary:      Effort: Pulmonary effort is normal.      Breath sounds: Normal breath sounds.   Chest:   Breasts:     Right: Normal.      Left: Normal.   Abdominal:      General: Abdomen is flat. Bowel sounds are normal.      Palpations: Abdomen is soft.   Genitourinary:     General: Normal vulva.      Vagina: Normal.      Cervix: Normal.      Uterus: Normal.       Adnexa: Right adnexa normal and left adnexa normal.   Musculoskeletal:      Cervical back: Neck supple.   Skin:     General: Skin is warm and dry.   Neurological:      Mental Status: She is alert and oriented to person, place, and time.   Psychiatric:         Mood and Affect: Mood normal.         Behavior: Behavior normal.            Assessment and Plan    Problem List Items Addressed This Visit       Menorrhagia with irregular cycle     Other Visit Diagnoses       Encounter for annual routine gynecological examination    -  Primary    Relevant Orders    LIQUID-BASED PAP SMEAR WITH HPV GENOTYPING REGARDLESS OF INTERPRETATION ([facility])    Encounter for sterilization                GYN annual well woman exam.   Reviewed pap guidelines.   Encouraged use of condoms for STD prevention.  OCP's/Vaginal Ring - Discussed side effects of nausea, BTB, headaches, breast tenderness and slight weight gain in the first three cycles.  Understands risks of blood clots, stroke, and theoretical risk of breast cancer.  Denies family history of blood clots.  Reviewed " monthly self breast exams.  Instructed to call with lumps, pain, or breast discharge.    Reviewed exercise as a preventative health measures.   Reccommended Flu Vaccine in Fall of each year.  Discussed treatment options and since she is finished with child bearing will proceed with laparoscopic bilateral salpingectomy, hysteroscopy, D&C, endometrial ablation with Novasure. Risks of surgery explained including bleeding, infection, damage to internal organs, need for additional surgery and failure. All questions answered.   Return in about 1 month (around 11/12/2024) for preop visit .    Dean Tate MD  10/11/2024

## 2024-10-15 LAB — REF LAB TEST METHOD: NORMAL

## 2024-11-05 ENCOUNTER — TELEPHONE (OUTPATIENT)
Dept: OBSTETRICS AND GYNECOLOGY | Facility: CLINIC | Age: 29
End: 2024-11-05

## 2024-11-05 NOTE — TELEPHONE ENCOUNTER
Hub staff attempted to follow warm transfer process and was unsuccessful     Caller: Virginia Pandey    Relationship to patient: Self    Best call back number: [phone]    Patient is needing: PT STATED SHE WAS TOLD HER PREOP APPT WAS AT 3PM ON 11/12 AND GOT THE REMINDER FOR THE APPT THAT IS AT 8AM.     PT HAS ALREADY TAKEN OFF WORK FOR 3PM APPT.   NO CURRENT AVAILABILITY FOR APPT AT 3PM.

## 2024-11-12 ENCOUNTER — OFFICE VISIT (OUTPATIENT)
Dept: OBSTETRICS AND GYNECOLOGY | Facility: CLINIC | Age: 29
End: 2024-11-12
Payer: COMMERCIAL

## 2024-11-12 VITALS
HEIGHT: 63 IN | BODY MASS INDEX: 37.17 KG/M2 | DIASTOLIC BLOOD PRESSURE: 82 MMHG | SYSTOLIC BLOOD PRESSURE: 122 MMHG | WEIGHT: 209.8 LBS

## 2024-11-12 DIAGNOSIS — Z30.2 ENCOUNTER FOR STERILIZATION: ICD-10-CM

## 2024-11-12 DIAGNOSIS — N92.1 MENORRHAGIA WITH IRREGULAR CYCLE: ICD-10-CM

## 2024-11-12 DIAGNOSIS — Z01.818 PREOPERATIVE TESTING: Primary | ICD-10-CM

## 2024-11-12 NOTE — PROGRESS NOTES
Gynecologic Preoperative Exam Note        Subjective   Virginia Pandey is a 29 y.o. year old  who is scheduled for Laparoscopic bilateral salpingectomy, hysteroscopy D&C, endometrial ablation with novasure at [facility] on 2024 at 10:15 AM.  Pre Admission testing has been scheduled for 2024 at  [facility] . [facility] . Her pre operative diagnosis is Menorrhagia and Desire for Permanent Sterilization. She does not need to see her PCP for preop clearance for this surgery. Patient's last menstrual period was 2024.. Her birth control method is OCP Samples of Slynd.  Her BMI is Body mass index is 37.17 kg/m²..    She has reviewed the information pertaining to hysteroscopy, D&C, endometrial ablation, and bilateral salpingectomy.    She understands the risks of bleeding, infection, possible damage to other organ systems, including but not limited to the gastrointestinal tract and genitourinary tract.  She also understands the specific risks listed in the preop information (video, pamphlets, etc.).    She has reviewed and signed the preop consent form.    Her code status is: FULL     She has been instructed to have a light dinner the night before surgery, then nothing to eat or drink after midnight.  The day of surgery do not chew gum or smoke.  Remove all jewelry, nail polish, contact lenses prior to coming to the hospital.  Do not bring valuables or large sums of money with you. Patient was instructed on what time to arrive and where to check in, maps were given.  She was instructed that she will meet an Anesthesiologist and that an IV will be started to provide fluids and sedation.  The total time of procedure was discussed.  She was instructed that she will need a .      No Known Allergies  She has confirmed that she is not allergic to Latex.     She is on the following medications. These were reviewed with the patient today and instructed on which medications are ok to take with a sip of  water prior to the surgery.      Current Outpatient Medications:     Adalimumab (Humira Pediatric Crohns Start) 80 MG/0.8ML Prefilled Syringe Kit, Inject  under the skin into the appropriate area as directed., Disp: , Rfl:     Adalimumab (HUMIRA PEN SC), Inject  under the skin into the appropriate area as directed., Disp: , Rfl:     Humira Pen 80 MG/0.8ML injection, , Disp: , Rfl:     ibuprofen (ADVIL,MOTRIN) 800 MG tablet, Take 1 tablet by mouth Every 6 (Six) Hours., Disp: , Rfl:      Past Medical History:   Diagnosis Date    Hidradenitis suppurativa     Hydradenitis     Hypertension     [place] teeth extracted      Past Surgical History:   Procedure Laterality Date     SECTION      WISDOM TOOTH EXTRACTION       OB History    Para Term  AB Living   2 2 1 1 0 2   SAB IAB Ectopic Molar Multiple Live Births   0 0 0 0 0 2      # Outcome Date GA Lbr Rashel/2nd Weight Sex Type Anes PTL Lv   2  21 33w1d    CS-Unspec   JASMYN   1 Term 14 37w1d  3572 g (7 lb 14 oz) M CS-LTranv EPI N JASMYN      Complications: Failure to Progress in First Stage, Preeclampsia     Social History     Tobacco Use   Smoking Status Never   Smokeless Tobacco Never     Social History     Substance and Sexual Activity   Alcohol Use Yes    Comment: Daily     Social History     Substance and Sexual Activity   Drug Use Defer         Review of Systems   Constitutional: Negative.    Respiratory: Negative.     Cardiovascular: Negative.    Gastrointestinal: Negative.    Genitourinary:  Positive for menstrual problem. Negative for breast discharge, breast lump, breast pain and pelvic pain.   Musculoskeletal: Negative.    Neurological: Negative.    Psychiatric/Behavioral: Negative.        All other systems reviewed and negative.          Objective    Vitals:    24 1408   BP: 122/82         Physical Exam  Vitals reviewed.   Constitutional:       Appearance: Normal appearance.   HENT:      Head: Normocephalic and atraumatic.    Cardiovascular:      Rate and Rhythm: Normal rate and regular rhythm.   Pulmonary:      Effort: Pulmonary effort is normal.      Breath sounds: Normal breath sounds.   Abdominal:      General: Abdomen is flat. Bowel sounds are normal.      Palpations: Abdomen is soft.   Skin:     General: Skin is warm and dry.   Neurological:      Mental Status: She is alert and oriented to person, place, and time.   Psychiatric:         Mood and Affect: Mood normal.         Behavior: Behavior normal.         Assessment   Problem List Items Addressed This Visit       Menorrhagia with irregular cycle    Relevant Orders    HCG, B-subunit, Quantitative    CBC (No Diff)    Comprehensive Metabolic Panel     Other Visit Diagnoses       Preoperative testing    -  Primary    Relevant Orders    HCG, B-subunit, Quantitative    CBC (No Diff)    Comprehensive Metabolic Panel    Encounter for sterilization        Relevant Orders    HCG, B-subunit, Quantitative    CBC (No Diff)    Comprehensive Metabolic Panel                     Plan   Will proceed with laparoscopic bilateral salpingectomy, hysteroscopy, D&C, endometrial ablation with Novasure for sterilization and menorrhagia at [facility].   Risks of surgery were reviewed with the patient including risks of bleeding, infection, damage to other organ systems including, but not limited to GI and  tracts (bowel, bladder, blood vessels, nerves) risks of Anesthesia, as well as the risk the surgery will not produce the desired results, possible need for additional surgery, death, risk of uterine perforation.  PAT Scheduled    Abdirahman has been obtained and reviewed   Pain Medication Consent Form has been signed.  A review regarding proper medication administration, impact on driving and working while medicated, the safety of use in pregnancy, the potential for overdose and the proper disposal and storage of controlled medications has been done with the patient.          Dean Tate MD  Visit  Date: 11/12/2024

## 2024-11-13 LAB
ALBUMIN SERPL-MCNC: 4.4 G/DL (ref 3.5–5.2)
ALBUMIN/GLOB SERPL: 1.6 G/DL
ALP SERPL-CCNC: 144 U/L (ref 39–117)
ALT SERPL-CCNC: 18 U/L (ref 1–33)
AST SERPL-CCNC: 18 U/L (ref 1–32)
BILIRUB SERPL-MCNC: <0.2 MG/DL (ref 0–1.2)
BUN SERPL-MCNC: 9 MG/DL (ref 6–20)
BUN/CREAT SERPL: 10.6 (ref 7–25)
CALCIUM SERPL-MCNC: 9.6 MG/DL (ref 8.6–10.5)
CHLORIDE SERPL-SCNC: 104 MMOL/L (ref 98–107)
CO2 SERPL-SCNC: 24.6 MMOL/L (ref 22–29)
CREAT SERPL-MCNC: 0.85 MG/DL (ref 0.57–1)
EGFRCR SERPLBLD CKD-EPI 2021: 95.8 ML/MIN/1.73
ERYTHROCYTE [DISTWIDTH] IN BLOOD BY AUTOMATED COUNT: 14.5 % (ref 12.3–15.4)
GLOBULIN SER CALC-MCNC: 2.7 GM/DL
GLUCOSE SERPL-MCNC: 93 MG/DL (ref 65–99)
HCG INTACT+B SERPL-ACNC: <1 MIU/ML
HCT VFR BLD AUTO: 38.4 % (ref 34–46.6)
HGB BLD-MCNC: 11.8 G/DL (ref 12–15.9)
MCH RBC QN AUTO: 26.2 PG (ref 26.6–33)
MCHC RBC AUTO-ENTMCNC: 30.7 G/DL (ref 31.5–35.7)
MCV RBC AUTO: 85.1 FL (ref 79–97)
PLATELET # BLD AUTO: 383 10*3/MM3 (ref 140–450)
POTASSIUM SERPL-SCNC: 4.1 MMOL/L (ref 3.5–5.2)
PROT SERPL-MCNC: 7.1 G/DL (ref 6–8.5)
RBC # BLD AUTO: 4.51 10*6/MM3 (ref 3.77–5.28)
SODIUM SERPL-SCNC: 139 MMOL/L (ref 136–145)
WBC # BLD AUTO: 10.56 10*3/MM3 (ref 3.4–10.8)

## 2024-11-14 ENCOUNTER — OUTSIDE FACILITY SERVICE (OUTPATIENT)
Dept: OBSTETRICS AND GYNECOLOGY | Facility: CLINIC | Age: 29
End: 2024-11-14
Payer: COMMERCIAL

## 2024-11-14 DIAGNOSIS — Z90.79 STATUS POST BILATERAL SALPINGECTOMY: Primary | ICD-10-CM

## 2024-11-14 PROCEDURE — 88305 TISSUE EXAM BY PATHOLOGIST: CPT | Performed by: OBSTETRICS & GYNECOLOGY

## 2024-11-14 PROCEDURE — 88302 TISSUE EXAM BY PATHOLOGIST: CPT | Performed by: OBSTETRICS & GYNECOLOGY

## 2024-11-14 RX ORDER — OXYCODONE AND ACETAMINOPHEN 5; 325 MG/1; MG/1
1 TABLET ORAL EVERY 8 HOURS PRN
Qty: 12 TABLET | Refills: 0 | Status: SHIPPED | OUTPATIENT
Start: 2024-11-14

## 2024-11-14 RX ORDER — IBUPROFEN 600 MG/1
600 TABLET, FILM COATED ORAL EVERY 6 HOURS PRN
Qty: 30 TABLET | Refills: 3 | Status: SHIPPED | OUTPATIENT
Start: 2024-11-14

## 2024-11-14 RX ORDER — PROMETHAZINE HYDROCHLORIDE 12.5 MG/1
12.5 TABLET ORAL EVERY 6 HOURS PRN
Qty: 12 TABLET | Refills: 0 | Status: SHIPPED | OUTPATIENT
Start: 2024-11-14

## 2024-11-15 ENCOUNTER — LAB REQUISITION (OUTPATIENT)
Dept: LAB | Facility: HOSPITAL | Age: 29
End: 2024-11-15
Payer: COMMERCIAL

## 2024-11-15 DIAGNOSIS — Z30.2 ENCOUNTER FOR STERILIZATION: ICD-10-CM

## 2024-11-18 LAB
CYTO UR: NORMAL
LAB AP CASE REPORT: NORMAL
LAB AP CLINICAL INFORMATION: NORMAL
PATH REPORT.FINAL DX SPEC: NORMAL
PATH REPORT.GROSS SPEC: NORMAL

## 2024-11-27 ENCOUNTER — OFFICE VISIT (OUTPATIENT)
Dept: OBSTETRICS AND GYNECOLOGY | Facility: CLINIC | Age: 29
End: 2024-11-27
Payer: COMMERCIAL

## 2024-11-27 VITALS
SYSTOLIC BLOOD PRESSURE: 116 MMHG | DIASTOLIC BLOOD PRESSURE: 74 MMHG | HEIGHT: 63 IN | WEIGHT: 208.6 LBS | BODY MASS INDEX: 36.96 KG/M2

## 2024-11-27 DIAGNOSIS — R10.9 ABDOMINAL CRAMPING: ICD-10-CM

## 2024-11-27 DIAGNOSIS — Z09 POSTOPERATIVE EXAMINATION: Primary | ICD-10-CM

## 2024-11-27 NOTE — PROGRESS NOTES
OBGYN Postoperative Exam Note          Subjective   Chief Complaint   Patient presents with    Post-op     Virginia Carol Pandey is a 29 y.o. year old  presenting to be seen for her post-operative visit. She is S/P laparoscopic bilateral salpingectomy, hysteroscopy D&C, endometrial ablation with novasure  on 2024 at [facility] for Menorrhagia and Desire for Permanent Sterilization. Currently she reports no problems with eating, bowel movements, voiding, or wound drainage and pain is well controlled.    The pathology results from her procedure are in Virginia's record and are benign.      OTHER THINGS SHE WANTS TO DISCUSS TODAY:  Lower/ Mid back pain since the procedure and when she takes ibuprofen it does help some but then returns.       Current Outpatient Medications:     Humira Pen 80 MG/0.8ML injection, , Disp: , Rfl:     ibuprofen (ADVIL,MOTRIN) 600 MG tablet, Take 1 tablet by mouth Every 6 (Six) Hours As Needed for Mild Pain., Disp: 30 tablet, Rfl: 3    ibuprofen (ADVIL,MOTRIN) 800 MG tablet, Take 1 tablet by mouth Every 6 (Six) Hours., Disp: , Rfl:     promethazine (PHENERGAN) 12.5 MG tablet, Take 1 tablet by mouth Every 6 (Six) Hours As Needed for Nausea., Disp: 12 tablet, Rfl: 0    oxyCODONE-acetaminophen (Percocet) 5-325 MG per tablet, Take 1 tablet by mouth Every 8 (Eight) Hours As Needed for Moderate Pain. (Patient not taking: Reported on 2024), Disp: 12 tablet, Rfl: 0     Past Medical History:   Diagnosis Date    Hidradenitis suppurativa     Hydradenitis     Hypertension     [place] teeth extracted         Past Surgical History:   Procedure Laterality Date     SECTION      DILATATION AND CURETTAGE  2024    lap. bilateral salpingectomy, hysteroscopy D&C, endometrial ablation with novasure    ENDOMETRIAL ABLATION  2024    lap. bilateral salpingectomy, hysteroscopy D&C, endometrial ablation with novasure    HYSTEROSCOPY  2024    lap. bilateral salpingectomy,  "hysteroscopy D&C, endometrial ablation with novasure    SALPINGECTOMY Bilateral 11/14/2024    lap. bilateral salpingectomy, hysteroscopy D&C, endometrial ablation with novasure    WISDOM TOOTH EXTRACTION         The following portions of the patient's history were reviewed and updated as appropriate:current medications and allergies    Review of Systems   Constitutional: Negative.    Respiratory: Negative.     Cardiovascular: Negative.    Gastrointestinal: Negative.    Genitourinary: Negative.    Musculoskeletal: Negative.    Neurological: Negative.    Psychiatric/Behavioral: Negative.            Objective   /74   Ht 160 cm (62.99\")   Wt 94.6 kg (208 lb 9.6 oz)   LMP 11/11/2024   Breastfeeding No   BMI 36.96 kg/m²     Physical Exam  Vitals reviewed.   Constitutional:       Appearance: Normal appearance.   HENT:      Head: Normocephalic and atraumatic.   Abdominal:      General: Abdomen is flat.      Palpations: Abdomen is soft.   Skin:     General: Skin is warm and dry.   Neurological:      Mental Status: She is alert and oriented to person, place, and time.   Psychiatric:         Mood and Affect: Mood normal.         Behavior: Behavior normal.              Assessment   S/P laparoscopic bilateral salpingectomy, hysteroscopy, D&C, endometrial ablation with Novasure  Abdominal cramping     Plan   May return to full activity with no restrictions  Pathology was reviewed with patient and Any significant events that occurred during surgery reviewed  NSAIDs prn for cramping, reassurance given  The importance of keeping all planned follow-up and taking all medications as prescribed was emphasized.  Return in about 11 months (around 10/27/2025) for Annual physical.              Dean Tate MD  11/27/2024     "

## 2024-12-09 ENCOUNTER — TELEPHONE (OUTPATIENT)
Dept: INTERNAL MEDICINE | Facility: CLINIC | Age: 29
End: 2024-12-09
Payer: COMMERCIAL

## 2024-12-09 NOTE — TELEPHONE ENCOUNTER
Patient is running high blood pressure of 172/139 unknown cause, would like to see PCP ASAP. Requested call back.

## 2024-12-09 NOTE — TELEPHONE ENCOUNTER
Spoke with patient and she said that she has been experiencing high blood pressure since Friday. Patient stated she is not having any chest pains or any other symptoms.    Patient is scheduled to see Dr. Sanchez tomorrow, 12/10/2024

## 2024-12-10 ENCOUNTER — PATIENT MESSAGE (OUTPATIENT)
Dept: INTERNAL MEDICINE | Facility: CLINIC | Age: 29
End: 2024-12-10

## 2024-12-10 ENCOUNTER — OFFICE VISIT (OUTPATIENT)
Dept: INTERNAL MEDICINE | Facility: CLINIC | Age: 29
End: 2024-12-10
Payer: COMMERCIAL

## 2024-12-10 VITALS
DIASTOLIC BLOOD PRESSURE: 118 MMHG | TEMPERATURE: 97.5 F | RESPIRATION RATE: 18 BRPM | HEART RATE: 76 BPM | SYSTOLIC BLOOD PRESSURE: 160 MMHG | BODY MASS INDEX: 35.86 KG/M2 | WEIGHT: 202.4 LBS

## 2024-12-10 DIAGNOSIS — N92.1 MENORRHAGIA WITH IRREGULAR CYCLE: ICD-10-CM

## 2024-12-10 DIAGNOSIS — I10 PRIMARY HYPERTENSION: Primary | ICD-10-CM

## 2024-12-10 DIAGNOSIS — R68.89 HEAT INTOLERANCE: ICD-10-CM

## 2024-12-10 DIAGNOSIS — E66.812 CLASS 2 SEVERE OBESITY DUE TO EXCESS CALORIES WITH SERIOUS COMORBIDITY AND BODY MASS INDEX (BMI) OF 35.0 TO 35.9 IN ADULT: ICD-10-CM

## 2024-12-10 DIAGNOSIS — R74.01 TRANSAMINITIS: ICD-10-CM

## 2024-12-10 DIAGNOSIS — N17.9 AKI (ACUTE KIDNEY INJURY): ICD-10-CM

## 2024-12-10 DIAGNOSIS — E66.01 CLASS 2 SEVERE OBESITY DUE TO EXCESS CALORIES WITH SERIOUS COMORBIDITY AND BODY MASS INDEX (BMI) OF 35.0 TO 35.9 IN ADULT: ICD-10-CM

## 2024-12-10 PROBLEM — Z34.90 TERM PREGNANCY: Status: RESOLVED | Noted: 2021-08-23 | Resolved: 2024-12-10

## 2024-12-10 LAB
ALBUMIN SERPL-MCNC: 4.3 G/DL (ref 3.5–5.2)
ALBUMIN/GLOB SERPL: 1.3 G/DL
ALP SERPL-CCNC: 127 U/L (ref 39–117)
ALT SERPL W P-5'-P-CCNC: 20 U/L (ref 1–33)
ANION GAP SERPL CALCULATED.3IONS-SCNC: 10.4 MMOL/L (ref 5–15)
AST SERPL-CCNC: 20 U/L (ref 1–32)
BASOPHILS # BLD AUTO: 0.03 10*3/MM3 (ref 0–0.2)
BASOPHILS NFR BLD AUTO: 0.4 % (ref 0–1.5)
BILIRUB SERPL-MCNC: 0.5 MG/DL (ref 0–1.2)
BUN SERPL-MCNC: 7 MG/DL (ref 6–20)
BUN/CREAT SERPL: 5.5 (ref 7–25)
CALCIUM SPEC-SCNC: 9.6 MG/DL (ref 8.6–10.5)
CHLORIDE SERPL-SCNC: 104 MMOL/L (ref 98–107)
CO2 SERPL-SCNC: 23.6 MMOL/L (ref 22–29)
CREAT SERPL-MCNC: 1.27 MG/DL (ref 0.57–1)
DEPRECATED RDW RBC AUTO: 46.8 FL (ref 37–54)
EGFRCR SERPLBLD CKD-EPI 2021: 59.2 ML/MIN/1.73
EOSINOPHIL # BLD AUTO: 0.03 10*3/MM3 (ref 0–0.4)
EOSINOPHIL NFR BLD AUTO: 0.4 % (ref 0.3–6.2)
ERYTHROCYTE [DISTWIDTH] IN BLOOD BY AUTOMATED COUNT: 15.2 % (ref 12.3–15.4)
EXPIRATION DATE: NORMAL
FERRITIN SERPL-MCNC: 58.8 NG/ML (ref 13–150)
FSH SERPL-ACNC: 1.68 MIU/ML
GLOBULIN UR ELPH-MCNC: 3.2 GM/DL
GLUCOSE SERPL-MCNC: 99 MG/DL (ref 65–99)
HCT VFR BLD AUTO: 40.6 % (ref 34–46.6)
HGB BLD-MCNC: 12.9 G/DL (ref 12–15.9)
IMM GRANULOCYTES # BLD AUTO: 0.03 10*3/MM3 (ref 0–0.05)
IMM GRANULOCYTES NFR BLD AUTO: 0.4 % (ref 0–0.5)
INR PPP: 1.1 (ref 0.9–1.1)
LH SERPL-ACNC: 1.53 MIU/ML
LYMPHOCYTES # BLD AUTO: 2.32 10*3/MM3 (ref 0.7–3.1)
LYMPHOCYTES NFR BLD AUTO: 27.8 % (ref 19.6–45.3)
Lab: NORMAL
MCH RBC QN AUTO: 27.3 PG (ref 26.6–33)
MCHC RBC AUTO-ENTMCNC: 31.8 G/DL (ref 31.5–35.7)
MCV RBC AUTO: 86 FL (ref 79–97)
MONOCYTES # BLD AUTO: 0.69 10*3/MM3 (ref 0.1–0.9)
MONOCYTES NFR BLD AUTO: 8.3 % (ref 5–12)
NEUTROPHILS NFR BLD AUTO: 5.25 10*3/MM3 (ref 1.7–7)
NEUTROPHILS NFR BLD AUTO: 62.7 % (ref 42.7–76)
NRBC BLD AUTO-RTO: 0 /100 WBC (ref 0–0.2)
PLATELET # BLD AUTO: 365 10*3/MM3 (ref 140–450)
PMV BLD AUTO: 9.8 FL (ref 6–12)
POTASSIUM SERPL-SCNC: 3.5 MMOL/L (ref 3.5–5.2)
PROLACTIN SERPL-MCNC: 6.66 NG/ML (ref 4.79–23.3)
PROT SERPL-MCNC: 7.5 G/DL (ref 6–8.5)
RBC # BLD AUTO: 4.72 10*6/MM3 (ref 3.77–5.28)
SODIUM SERPL-SCNC: 138 MMOL/L (ref 136–145)
TSH SERPL DL<=0.05 MIU/L-ACNC: 0.36 UIU/ML (ref 0.27–4.2)
WBC NRBC COR # BLD AUTO: 8.35 10*3/MM3 (ref 3.4–10.8)

## 2024-12-10 PROCEDURE — 83002 ASSAY OF GONADOTROPIN (LH): CPT | Performed by: STUDENT IN AN ORGANIZED HEALTH CARE EDUCATION/TRAINING PROGRAM

## 2024-12-10 PROCEDURE — 1160F RVW MEDS BY RX/DR IN RCRD: CPT | Performed by: STUDENT IN AN ORGANIZED HEALTH CARE EDUCATION/TRAINING PROGRAM

## 2024-12-10 PROCEDURE — 82626 DEHYDROEPIANDROSTERONE: CPT | Performed by: STUDENT IN AN ORGANIZED HEALTH CARE EDUCATION/TRAINING PROGRAM

## 2024-12-10 PROCEDURE — 83001 ASSAY OF GONADOTROPIN (FSH): CPT | Performed by: STUDENT IN AN ORGANIZED HEALTH CARE EDUCATION/TRAINING PROGRAM

## 2024-12-10 PROCEDURE — 1159F MED LIST DOCD IN RCRD: CPT | Performed by: STUDENT IN AN ORGANIZED HEALTH CARE EDUCATION/TRAINING PROGRAM

## 2024-12-10 PROCEDURE — 36416 COLLJ CAPILLARY BLOOD SPEC: CPT | Performed by: STUDENT IN AN ORGANIZED HEALTH CARE EDUCATION/TRAINING PROGRAM

## 2024-12-10 PROCEDURE — 84146 ASSAY OF PROLACTIN: CPT | Performed by: STUDENT IN AN ORGANIZED HEALTH CARE EDUCATION/TRAINING PROGRAM

## 2024-12-10 PROCEDURE — 84443 ASSAY THYROID STIM HORMONE: CPT | Performed by: STUDENT IN AN ORGANIZED HEALTH CARE EDUCATION/TRAINING PROGRAM

## 2024-12-10 PROCEDURE — 82672 ASSAY OF ESTROGEN: CPT | Performed by: STUDENT IN AN ORGANIZED HEALTH CARE EDUCATION/TRAINING PROGRAM

## 2024-12-10 PROCEDURE — 84402 ASSAY OF FREE TESTOSTERONE: CPT | Performed by: STUDENT IN AN ORGANIZED HEALTH CARE EDUCATION/TRAINING PROGRAM

## 2024-12-10 PROCEDURE — 80053 COMPREHEN METABOLIC PANEL: CPT | Performed by: STUDENT IN AN ORGANIZED HEALTH CARE EDUCATION/TRAINING PROGRAM

## 2024-12-10 PROCEDURE — 82397 CHEMILUMINESCENT ASSAY: CPT | Performed by: STUDENT IN AN ORGANIZED HEALTH CARE EDUCATION/TRAINING PROGRAM

## 2024-12-10 PROCEDURE — 82627 DEHYDROEPIANDROSTERONE: CPT | Performed by: STUDENT IN AN ORGANIZED HEALTH CARE EDUCATION/TRAINING PROGRAM

## 2024-12-10 PROCEDURE — 85025 COMPLETE CBC W/AUTO DIFF WBC: CPT | Performed by: STUDENT IN AN ORGANIZED HEALTH CARE EDUCATION/TRAINING PROGRAM

## 2024-12-10 PROCEDURE — 82728 ASSAY OF FERRITIN: CPT | Performed by: STUDENT IN AN ORGANIZED HEALTH CARE EDUCATION/TRAINING PROGRAM

## 2024-12-10 PROCEDURE — 1126F AMNT PAIN NOTED NONE PRSNT: CPT | Performed by: STUDENT IN AN ORGANIZED HEALTH CARE EDUCATION/TRAINING PROGRAM

## 2024-12-10 PROCEDURE — 83498 ASY HYDROXYPROGESTERONE 17-D: CPT | Performed by: STUDENT IN AN ORGANIZED HEALTH CARE EDUCATION/TRAINING PROGRAM

## 2024-12-10 PROCEDURE — 85610 PROTHROMBIN TIME: CPT | Performed by: STUDENT IN AN ORGANIZED HEALTH CARE EDUCATION/TRAINING PROGRAM

## 2024-12-10 PROCEDURE — 99214 OFFICE O/P EST MOD 30 MIN: CPT | Performed by: STUDENT IN AN ORGANIZED HEALTH CARE EDUCATION/TRAINING PROGRAM

## 2024-12-10 RX ORDER — OLMESARTAN MEDOXOMIL 20 MG/1
20 TABLET ORAL DAILY
Qty: 30 TABLET | Refills: 1 | Status: SHIPPED | OUTPATIENT
Start: 2024-12-10

## 2024-12-10 NOTE — PROGRESS NOTES
Acute Office Visit      Date: 12/10/2024   Patient Name: Virginia Pandey  : 1995   MRN: [id number]     Chief Complaint:    Chief Complaint   Patient presents with    Hypertension     fu       History of Present Illness: Virginia Pandey is a 29 y.o. female.    History of Present Illness  The patient is a 29-year-old female who presents for an acute visit.    Chronic Hypertension  She has a history of preeclampsia during her two pregnancies, which led to the development of chronic hypertension. She was previously on antihypertensive medication for 3 to 6 months postpartum, but has been off medication for the past 3 years. She reports no familial history of hypertension. She is a plasma donor and has noticed elevated blood pressure readings during her recent donations. She has been self-monitoring her blood pressure, with systolic readings consistently above 116, ranging from 169/116 to 162/115. Despite waiting periods of 15 and 30 minutes, her blood pressure did not decrease below 160. Her obstetrician-gynecologist (OB-GYN) recorded a blood pressure of 160/130, which was lower on repeat measurement. She reports no symptoms such as headaches. She is currently not pregnant and has undergone endometrial ablation and salpingectomy. She was on labetalol during her pregnancy.  - Onset: Developed chronic hypertension postpartum.  - Duration: Off antihypertensive medication for the past 3 years.  - Character: Elevated blood pressure readings during plasma donations and self-monitoring.  - Alleviating/Aggravating Factors: Blood pressure did not decrease below 160 despite waiting periods of 15 and 30 minutes.  - Timing: Consistently elevated systolic readings above 160.  - Severity: Systolic readings ranging from 169/116 to 162/115; OB-GYN recorded 160/130, lower on repeat measurement.    Abnormal Uterine Bleeding and Hot Flashes  She has been experiencing abnormal uterine bleeding, which she has  discussed with her new OB-GYN over four visits. She also reports hot flashes, which began approximately 4 to 5 months ago, and has been diagnosed with two mild ovarian cysts.  - Onset: Hot flashes began approximately 4 to 5 months ago.  - Character: Abnormal uterine bleeding and hot flashes.    Dermatology and Liver Enzymes  She had a dermatology appointment in July, during which blood work was conducted. She is on Humira and undergoes blood panel work every 6 months. She was informed of elevated liver enzymes and was advised to follow up with her primary care physician, but she has not received any further communication regarding this.    FAMILY HISTORY  She does not believe her mother has hypertension.    MEDICATIONS  - Current: Humira  - Past: labetalol    IMMUNIZATIONS  - Influenza vaccine: Received        Subjective      Review of Systems:   Review of Systems   All other systems reviewed and are negative.      I have reviewed the patients family history, social history, past medical history, past surgical history and have updated it as appropriate.     Medications:     Current Outpatient Medications:     Humira Pen 80 MG/0.8ML injection, , Disp: , Rfl:     ibuprofen (ADVIL,MOTRIN) 600 MG tablet, Take 1 tablet by mouth Every 6 (Six) Hours As Needed for Mild Pain., Disp: 30 tablet, Rfl: 3    olmesartan (BENICAR) 20 MG tablet, Take 1 tablet by mouth Daily., Disp: 30 tablet, Rfl: 1    Allergies:   No Known Allergies    Objective     Physical Exam: Please see above  Vital Signs:   Vitals:    12/10/24 1125   BP: (!) 160/118   BP Location: Right arm   Patient Position: Sitting   Cuff Size: Adult   Pulse: 76   Resp: 18   Temp: 97.5 °F (36.4 °C)   TempSrc: Temporal   Weight: 91.8 kg (202 lb 6.4 oz)   PainSc: 0-No pain     Body mass index is 35.86 kg/m².    Physical Exam  Vitals and nursing note reviewed.   Constitutional:       General: She is not in acute distress.     Appearance: Normal appearance. She is obese. She  is not ill-appearing or toxic-appearing.   HENT:      Nose: No congestion or rhinorrhea.   Eyes:      General:         Right eye: No discharge.         Left eye: No discharge.      Conjunctiva/sclera: Conjunctivae normal.   Pulmonary:      Effort: Pulmonary effort is normal. No respiratory distress.   Abdominal:      General: Abdomen is flat. There is no distension.   Musculoskeletal:      Cervical back: Normal range of motion.   Skin:     Coloration: Skin is not jaundiced.      Findings: No rash.   Neurological:      General: No focal deficit present.      Mental Status: She is alert. Mental status is at baseline.      Coordination: Coordination normal.      Gait: Gait normal.   Psychiatric:         Mood and Affect: Mood normal.         Behavior: Behavior normal.         Thought Content: Thought content normal.         Judgment: Judgment normal.         Procedures    Results:   Labs:   TSH   Date Value Ref Range Status   12/10/2024 0.361 0.270 - 4.200 uIU/mL Final        Imaging:   No valid procedures specified.     Assessment / Plan      Assessment/Plan:   Problem List Items Addressed This Visit       Menorrhagia with irregular cycle    Overview     S/p endometrial ablation and salpingectomy          Relevant Orders    CBC & Differential (Completed)    Ferritin (Completed)    TSH (Completed)    FSH & LH (Completed)    Estrogens, Total    Antimullerian Hormone (AMH)    DHEA    DHEA-sulfate (Completed)    17-Hydroxyprogesterone    Testosterone Free Direct    Prolactin (Completed)    Transaminitis    Relevant Orders    Comprehensive Metabolic Panel (Completed)    POC INR (Completed)    Heat intolerance    Relevant Orders    TSH (Completed)    FSH & LH (Completed)    Estrogens, Total    Antimullerian Hormone (AMH)    DHEA    DHEA-sulfate (Completed)    17-Hydroxyprogesterone    Testosterone Free Direct    Prolactin (Completed)     Other Visit Diagnoses       Primary hypertension    -  Primary    Relevant Medications     olmesartan (BENICAR) 20 MG tablet    Other Relevant Orders    Comprehensive Metabolic Panel (Completed)    Class 2 severe obesity due to excess calories with serious comorbidity and body mass index (BMI) of 35.0 to 35.9 in adult        SAGRARIO (acute kidney injury)        Relevant Orders    Basic Metabolic Panel    Cystatin C    Microalbumin / Creatinine Urine Ratio - Urine, Clean Catch            Assessment & Plan  1. Hypertension  - Consistently elevated blood pressure across various settings (home, clinic, plasma donation center)  - Significant weight loss since September 2024 expected to positively impact blood pressure  - Increased risk for hypertension due to history of preeclampsia and genetic predisposition  - Not in hypertensive urgency or emergency  - Prescription for olmesartan 20 mg for approximately one month  - Continue home blood pressure monitoring and plasma donation  - Observe response to medication before resuming plasma donation  - If blood pressure decreases to less than 140/90 within the next few days, continue plasma donation without concerns  - If blood pressure remains elevated, inform us for necessary adjustments    2. Elevated liver enzymes  - Comprehensive metabolic panel, including liver enzymes, to be ordered  - INR test to be conducted  - If results are elevated, consider further tests such as [place] FibroSure test and liver ultrasound  - Weight loss recommended as the best approach to manage condition    3. Abnormal uterine bleeding  - Likely unrelated to hypertension, but could be influenced by hormonal changes post-pregnancy  - Adrenal or thyroid changes could potentially affect both menstrual bleeding and blood pressure  - Hormonal changes unlikely to significantly impact liver enzymes  - Hormone levels, including thyroid function, prolactin, FSH, LH, estrogen, antimullerian hormone, DHEA, and testosterone, to be assessed    Follow-up  - Patient to follow up in one month, or sooner if  necessary    PROCEDURE  Procedure Performed  Endometrial ablation and salpingectomy.    Results         Follow Up:   Return in about 8 weeks (around 2/7/2025) for Next scheduled follow up.    Patient or patient representative verbalized consent for the use of Ambient Listening during the visit with  Dandre Sanchez MD for chart documentation. 12/11/2024  12:10 EST        Dandre Sanchez MD  [facility] Devante Anderson

## 2024-12-11 LAB — DHEA-S SERPL-MCNC: 200 UG/DL (ref 84.8–378)

## 2024-12-13 ENCOUNTER — LAB (OUTPATIENT)
Dept: LAB | Facility: HOSPITAL | Age: 29
End: 2024-12-13
Payer: COMMERCIAL

## 2024-12-13 DIAGNOSIS — N17.9 AKI (ACUTE KIDNEY INJURY): ICD-10-CM

## 2024-12-13 LAB
ESTROGEN SERPL-MCNC: 284 PG/ML
MIS SERPL-MCNC: 1.06 NG/ML
TESTOST FREE SERPL-MCNC: 1 PG/ML (ref 0–4.2)

## 2024-12-13 PROCEDURE — 82043 UR ALBUMIN QUANTITATIVE: CPT

## 2024-12-13 PROCEDURE — 82570 ASSAY OF URINE CREATININE: CPT

## 2024-12-13 PROCEDURE — 80048 BASIC METABOLIC PNL TOTAL CA: CPT

## 2024-12-13 PROCEDURE — 82610 CYSTATIN C: CPT

## 2024-12-14 LAB
ALBUMIN UR-MCNC: 6.2 MG/DL
ANION GAP SERPL CALCULATED.3IONS-SCNC: 13.6 MMOL/L (ref 5–15)
BUN SERPL-MCNC: 5 MG/DL (ref 6–20)
BUN/CREAT SERPL: 5.1 (ref 7–25)
CALCIUM SPEC-SCNC: 9.2 MG/DL (ref 8.6–10.5)
CHLORIDE SERPL-SCNC: 104 MMOL/L (ref 98–107)
CO2 SERPL-SCNC: 24.4 MMOL/L (ref 22–29)
CREAT SERPL-MCNC: 0.98 MG/DL (ref 0.57–1)
CREAT UR-MCNC: 350.1 MG/DL
DHEA SERPL-MCNC: 368 NG/DL (ref 31–701)
EGFRCR SERPLBLD CKD-EPI 2021: 80.8 ML/MIN/1.73
GLUCOSE SERPL-MCNC: 85 MG/DL (ref 65–99)
MICROALBUMIN/CREAT UR: 17.7 MG/G (ref 0–29)
POTASSIUM SERPL-SCNC: 3.6 MMOL/L (ref 3.5–5.2)
SODIUM SERPL-SCNC: 142 MMOL/L (ref 136–145)

## 2024-12-16 LAB — 17OHP SERPL-MCNC: 65 NG/DL

## 2024-12-17 LAB — CYSTATIN C SERPL-MCNC: 0.79 MG/L (ref 0.6–1)

## 2024-12-19 ENCOUNTER — TELEPHONE (OUTPATIENT)
Dept: INTERNAL MEDICINE | Facility: CLINIC | Age: 29
End: 2024-12-19
Payer: COMMERCIAL

## 2024-12-19 NOTE — TELEPHONE ENCOUNTER
Caller: Virginia Pandey    Relationship: Self    Best call back number: [phone]    What form or medical record are you requesting: IMMUNIZATION RECORD    Who is requesting this form or medical record from you: SELF    How would you like to receive the form or medical records (pick-up, mail, fax): [employer]    Timeframe paperwork needed: ASAP    Additional notes: REQUESTING A COPY OF HER IMMUNIZATIONS TO BE SENT IN A LETTER ON [employer] FOR HER EMPLOYMENT

## 2024-12-19 NOTE — TELEPHONE ENCOUNTER
Immunizations certificate created in chart.  Notified Ambriel that she can fine the certificate in letters in my chart .  Verbal understanding and appreciation given

## 2025-02-10 ENCOUNTER — OFFICE VISIT (OUTPATIENT)
Dept: INTERNAL MEDICINE | Facility: CLINIC | Age: 30
End: 2025-02-10
Payer: COMMERCIAL

## 2025-02-10 VITALS
HEART RATE: 90 BPM | DIASTOLIC BLOOD PRESSURE: 122 MMHG | TEMPERATURE: 98 F | HEIGHT: 62 IN | SYSTOLIC BLOOD PRESSURE: 156 MMHG | BODY MASS INDEX: 35.51 KG/M2 | WEIGHT: 193 LBS | RESPIRATION RATE: 18 BRPM

## 2025-02-10 DIAGNOSIS — M54.9 UPPER BACK PAIN: ICD-10-CM

## 2025-02-10 DIAGNOSIS — E66.812 CLASS 2 SEVERE OBESITY DUE TO EXCESS CALORIES WITH SERIOUS COMORBIDITY AND BODY MASS INDEX (BMI) OF 35.0 TO 35.9 IN ADULT: ICD-10-CM

## 2025-02-10 DIAGNOSIS — N62 LARGE BREASTS: ICD-10-CM

## 2025-02-10 DIAGNOSIS — E66.01 CLASS 2 SEVERE OBESITY DUE TO EXCESS CALORIES WITH SERIOUS COMORBIDITY AND BODY MASS INDEX (BMI) OF 35.0 TO 35.9 IN ADULT: ICD-10-CM

## 2025-02-10 DIAGNOSIS — Z00.00 WELL ADULT EXAM: Primary | ICD-10-CM

## 2025-02-10 DIAGNOSIS — I10 PRIMARY HYPERTENSION: ICD-10-CM

## 2025-02-10 PROCEDURE — 99395 PREV VISIT EST AGE 18-39: CPT | Performed by: INTERNAL MEDICINE

## 2025-02-10 PROCEDURE — 2014F MENTAL STATUS ASSESS: CPT | Performed by: INTERNAL MEDICINE

## 2025-02-10 RX ORDER — OLMESARTAN MEDOXOMIL AND HYDROCHLOROTHIAZIDE 20/12.5 20; 12.5 MG/1; MG/1
1 TABLET ORAL DAILY
Qty: 90 TABLET | Refills: 1 | Status: SHIPPED | OUTPATIENT
Start: 2025-02-10 | End: 2026-02-10

## 2025-02-10 NOTE — PATIENT INSTRUCTIONS
MyPlate from USDA    MyPlate is an outline of a general healthy diet based on the Dietary Guidelines for Americans, 7807-9253, from the U.S. Department of Agriculture (USDA). It sets guidelines for how much food you should eat from each food group based on your age, sex, and level of physical activity.  What are tips for following MyPlate?  To follow MyPlate recommendations:  Eat a wide variety of fruits and vegetables, grains, and protein foods.  Serve smaller portions and eat less food throughout the day.  Limit portion sizes to avoid overeating.  Enjoy your food.  Get at least 150 minutes of exercise every week. This is about 30 minutes each day, 5 or more days per week.  It can be difficult to have every meal look like MyPlate. Think about MyPlate as eating guidelines for an entire day, rather than each individual meal.  Fruits and vegetables  Make one half of your plate fruits and vegetables.  Eat many different colors of fruits and vegetables each day.  For a 2,000-calorie daily food plan, eat:  2½ cups of vegetables every day.  2 cups of fruit every day.  1 cup is equal to:  1 cup raw or cooked vegetables.  1 cup raw fruit.  1 medium-sized orange, apple, or banana.  1 cup 100% fruit or vegetable juice.  2 cups raw leafy greens, such as lettuce, spinach, or kale.  ½ cup dried fruit.  Grains  One fourth of your plate should be grains.  Make at least half of the grains you eat each day whole grains.  For a 2,000-calorie daily food plan, eat 6 oz of grains every day.  1 oz is equal to:  1 slice bread.  1 cup cereal.  ½ cup cooked rice, cereal, or pasta.  Protein  One fourth of your plate should be protein.  Eat a wide variety of protein foods, including meat, poultry, fish, eggs, beans, nuts, and tofu.  For a 2,000-calorie daily food plan, eat 5½ oz of protein every day.  1 oz is equal to:  1 oz meat, poultry, or fish.  ¼ cup cooked beans.  1 egg.  ½ oz nuts or seeds.  1 Tbsp peanut butter.  Dairy  Drink fat-free  or low-fat (1%) milk.  Eat or drink dairy as a side to meals.  For a 2,000-calorie daily food plan, eat or drink 3 cups of dairy every day.  1 cup is equal to:  1 cup milk, yogurt, cottage cheese, or soy milk (soy beverage).  2 oz processed cheese.  1½ oz natural cheese.  Fats, oils, salt, and sugars  Only small amounts of oils are recommended.  Avoid foods that are high in calories and low in nutritional value (empty calories), like foods high in fat or added sugars.  Choose foods that are low in salt (sodium). Choose foods that have less than 140 milligrams (mg) of sodium per serving.  Drink water instead of sugary drinks. Drink enough fluid to keep your urine pale yellow.  Where to find support  Work with your health care provider or a dietitian to develop a customized eating plan that is right for you.  Download an marisela (mobile application) to help you track your daily food intake.  Where to find more information  USDA: ChooseMyPlate.gov  Summary  MyPlate is a general guideline for healthy eating from the USDA. It is based on the Dietary Guidelines for Americans, 4896-1618.  In general, fruits and vegetables should take up one half of your plate, grains should take up one fourth of your plate, and protein should take up one fourth of your plate.  This information is not intended to replace advice given to you by your health care provider. Make sure you discuss any questions you have with your health care provider.  Document Revised: 11/08/2021 Document Reviewed: 11/08/2021  [employer] Patient Education © 2024 [employer].

## 2025-02-10 NOTE — PROGRESS NOTES
Chief Complaint  Annual Exam    Subjective    Virginia Pandey is a 29 y.o. female.     Virginia Pandey presents to [facility] for     History of Present Illness  The patient presents for a physical exam.    She has been experiencing elevated blood pressure, which was initially identified during a plasma donation session. Despite her efforts to monitor her blood pressure, it remains high. She has been mindful of her salt intake and has made dietary modifications, including reducing fried foods and incorporating more fruits into her diet. She has also limited her consumption of sugary beverages, opting for zero-sugar alternatives when necessary. She was prescribed olmesartan 20 or 40 mg by Dr. Sanchez approximately 1 month ago, but the dosage remains uncertain.    She has been struggling with weight loss, unable to reduce her weight below 193 pounds despite regular gym attendance and dietary modifications. Her goal weight is 175 pounds. She has expressed disinterest in surgical interventions or GLP-1 injections for weight loss. She has been consistent with her gym routine since acquiring a membership earlier this year, with the exception of a week-long break due to a muscle strain.    She wears a triple G bra size and reports that her large bust size has been causing upper back pain, difficulty with daily activities, and has negatively impacted her overall quality of life. This issue has also led to mental health concerns, including worries about her ability to perform daily activities and chronic upper back pain, which have been mentally taxing and challenging for her. She is considering breast reduction surgery as a potential solution.    Supplemental Information  She had a Pap smear in November 2024.    MEDICATIONS  Current: Olmesartan.           Complete Adult Physical          Diet: regular tyring to make some improvement on salt consumption, more fruit  "consumptioin         Exercise: goes to the gym at least three times a week         Social History   No etoh  No smoking         Preventative Screenings    Pap smear up to date. Done in November         Immunizations:up to date            The following portions of the patient's history were reviewed and updated as appropriate: allergies, current medications, past family history, past medical history, past social history, past surgical history, and problem list.    Review of Systems   All other systems reviewed and are negative.      Objective   Body mass index is 35.29 kg/m².  Class 2 Severe Obesity (BMI >=35 and <=39.9). Obesity-related health conditions include the following: hypertension. Obesity is improving with lifestyle modifications. BMI is is above average; BMI management plan is completed. We discussed portion control and increasing exercise.       Vital Signs:   BP (!) 156/122 (BP Location: Right arm, Patient Position: Sitting, Cuff Size: Large Adult)   Pulse 90   Temp 98 °F (36.7 °C) (Temporal)   Resp 18   Ht 157.5 cm (62.01\")   Wt 87.5 kg (193 lb)   BMI 35.29 kg/m²       Physical Exam  Patient is alert and oriented x3, in no distress.  Head is normocephalic and atraumatic. Pupils are equal and react to light and accommodation. Extraocular muscles are intact. Membranes are moist.  Neck is supple. No cervical lymphadenopathy.  Chest is clear to auscultation, no rhonchi or wheeze.  Heart sounds S1, S2. No murmurs, rubs or gallop.  Positive bowel sounds, abdomen is soft, no masses or tenderness.  Posterior pulses are present, extremities are warm, good perfusion. Strength is 5 out of 5 in both upper and lower extremity distribution and symmetric.       Results  Laboratory Studies  Creatinine was 1.27 with a GFR of 59.2. Alkaline phosphatase was slightly elevated at 127.            Assessment and Plan  Diagnoses and all orders for this visit:  Assessment & Plan  1. Hypertension.  Her blood pressure " remains uncontrolled despite adherence to her prescribed medication regimen and dietary modifications. A lower dose of hydrochlorothiazide 12.5 mg will be added to her current olmesartan 20 mg, resulting in a combination pill of 20/12.5 mg to be taken once daily. The potential side effects and necessary precautions have been discussed. She is advised to continue monitoring her blood pressure readings periodically.    2. Obesity and weight loss.  A comprehensive discussion regarding obesity and weight loss management was conducted. Despite her efforts in lifestyle modification through diet and exercise, she has not yet achieved her desired goals. The advantages and disadvantages of GLP-1 medications were discussed. She will continue with the current medical management for her weight loss.    3. Enlarged bust.  She wears a triple G bra size. Her large bust size has been causing upper back pain, difficulty with daily activities, and has negatively impacted her overall quality of life. This issue has also led to mental health concerns, including worries about her ability to perform daily activities and chronic upper back pain, which have been mentally taxing and challenging for her. She is considering breast reduction surgery as a potential solution. After reviewing her history and physical examination, it is recommended that she be considered a candidate for breast reduction surgery. Appropriate referrals will be made accordingly.    4. Elevated creatinine levels.  Her creatinine level was 1.27 with a GFR of 59.2, and her alkaline phosphatase was slightly elevated at 127. These tests will be repeated, ensuring she is well-hydrated prior to the tests. Medical management will continue in this regard.    Follow-up  The patient will follow up in 4 to 6 months to assess her weight and overall progress with lifestyle modifications.       Diagnoses and all orders for this visit:    1. Well adult exam (Primary)    2. Primary  hypertension  -     olmesartan-hydrochlorothiazide (Benicar HCT) 20-12.5 MG per tablet; Take 1 tablet by mouth Daily.  Dispense: 90 tablet; Refill: 1  -     Comprehensive Metabolic Panel; Future    3. Upper back pain    4. Large breasts  -     Ambulatory Referral to Plastic Surgery    5. Class 2 severe obesity due to excess calories with serious comorbidity and body mass index (BMI) of 35.0 to 35.9 in adult              Follow Up   No follow-ups on file.  Patient was given instructions and counseling regarding her condition or for health maintenance advice. Please see specific information pulled into the AVS if appropriate.     Patient or patient representative verbalized consent for the use of Ambient Listening during the visit with  James Velez MD for chart documentation. 2/10/2025  13:52 EST

## 2025-02-18 ENCOUNTER — OFFICE VISIT (OUTPATIENT)
Dept: OBSTETRICS AND GYNECOLOGY | Facility: CLINIC | Age: 30
End: 2025-02-18
Payer: COMMERCIAL

## 2025-02-18 VITALS
BODY MASS INDEX: 35.81 KG/M2 | SYSTOLIC BLOOD PRESSURE: 124 MMHG | WEIGHT: 194.6 LBS | DIASTOLIC BLOOD PRESSURE: 82 MMHG | HEIGHT: 62 IN

## 2025-02-18 DIAGNOSIS — Z31.9 INFERTILITY MANAGEMENT: Primary | ICD-10-CM

## 2025-02-18 NOTE — PROGRESS NOTES
Chief Complaint   Patient presents with    Gynecologic Exam    Discuss Family Planning       Subjective   HPI  Virginia Pandey is a 29 y.o. female, , LMP was on No LMP recorded (lmp unknown). who presents to discuss egg retrieval referral to fertility office. Patient states she would like to go through [facility] .    Patient is S/P: laparoscopic bilateral salpingectomy, hysteroscopy D&C, endometrial ablation with novasure  on 2024 at [facility] for Menorrhagia and Desire for Permanent Sterilization. The decision was made due to heavy menstrual bleeding and her prior poor pregnancy outcomes complicated with severe preeclampsia.  She has decided she would like one more child and use her eggs that are still available even though she cannot carry a pregnancy.    Partner Status: Marital Status: single. His past medical history is notable for no medical issues..    Current Outpatient Medications on File Prior to Visit   Medication Sig Dispense Refill    Humira Pen 80 MG/0.8ML injection       ibuprofen (ADVIL,MOTRIN) 600 MG tablet Take 1 tablet by mouth Every 6 (Six) Hours As Needed for Mild Pain. 30 tablet 3    olmesartan-hydrochlorothiazide (Benicar HCT) 20-12.5 MG per tablet Take 1 tablet by mouth Daily. 90 tablet 1     No current facility-administered medications on file prior to visit.        Additional OB/GYN History   Last Pap : 10/11/2024  Last Completed Pap Smear            PAP SMEAR (Every 3 Years) Next due on 10/11/2027      10/11/2024  LIQUID-BASED PAP SMEAR WITH HPV GENOTYPING REGARDLESS OF INTERPRETATION (NICOLASA,COR,[facility]AD)                  History of abnormal Pap smear: no  Exercises Regularly: yes  Feelings of Anxiety or Depression: yes  Tobacco Usage?: No   OB History          2    Para   2    Term   1       1    AB        Living   2         SAB        IAB        Ectopic        Molar        Multiple        Live Births   2                  Current  "Outpatient Medications:     Humira Pen 80 MG/0.8ML injection, , Disp: , Rfl:     ibuprofen (ADVIL,MOTRIN) 600 MG tablet, Take 1 tablet by mouth Every 6 (Six) Hours As Needed for Mild Pain., Disp: 30 tablet, Rfl: 3    olmesartan-hydrochlorothiazide (Benicar HCT) 20-12.5 MG per tablet, Take 1 tablet by mouth Daily., Disp: 90 tablet, Rfl: 1     Past Medical History:   Diagnosis Date    Hidradenitis suppurativa     Hydradenitis     Hypertension     Term pregnancy 2021    [place] teeth extracted         Past Surgical History:   Procedure Laterality Date     SECTION      DILATATION AND CURETTAGE  2024    lap. bilateral salpingectomy, hysteroscopy D&C, endometrial ablation with novasure    ENDOMETRIAL ABLATION  2024    lap. bilateral salpingectomy, hysteroscopy D&C, endometrial ablation with novasure    HYSTEROSCOPY  2024    lap. bilateral salpingectomy, hysteroscopy D&C, endometrial ablation with novasure    SALPINGECTOMY Bilateral 2024    lap. bilateral salpingectomy, hysteroscopy D&C, endometrial ablation with novasure    WISDOM TOOTH EXTRACTION         The additional following portions of the patient's history were reviewed and updated as appropriate: allergies, current medications, past family history, past medical history, past social history, past surgical history, and problem list.    Review of Systems   Constitutional: Negative.    Respiratory: Negative.     Cardiovascular: Negative.    Gastrointestinal: Negative.    Genitourinary: Negative.    Musculoskeletal: Negative.    Neurological: Negative.    Psychiatric/Behavioral: Negative.       All other systems reviewed and are negative.     I have reviewed and agree with the HPI, ROS, and historical information as entered above. Dean Tate MD      Objective   /82   Ht 157.5 cm (62.01\")   Wt 88.3 kg (194 lb 9.6 oz)   LMP  (LMP Unknown)   Breastfeeding No   BMI 35.58 kg/m²     Physical Exam  Vitals reviewed. "   Constitutional:       Appearance: Normal appearance.   HENT:      Head: Normocephalic and atraumatic.   Abdominal:      General: Abdomen is flat.      Palpations: Abdomen is soft.   Skin:     General: Skin is warm and dry.   Neurological:      Mental Status: She is alert and oriented to person, place, and time.   Psychiatric:         Mood and Affect: Mood normal.         Behavior: Behavior normal.         Assessment & Plan     Assessment and Plan    Problem List Items Addressed This Visit    None  Visit Diagnoses       Infertility management    -  Primary    Relevant Orders    Ambulatory Referral to Infertility (Completed)            Discussed referral to [facility] for possible egg retrieval.    Return if symptoms worsen or fail to improve.      Dean Tate MD  02/18/2025

## 2025-03-31 ENCOUNTER — LAB (OUTPATIENT)
Dept: LAB | Facility: HOSPITAL | Age: 30
End: 2025-03-31
Payer: COMMERCIAL

## 2025-03-31 DIAGNOSIS — I10 PRIMARY HYPERTENSION: ICD-10-CM

## 2025-03-31 LAB
ALBUMIN SERPL-MCNC: 4 G/DL (ref 3.5–5.2)
ALBUMIN/GLOB SERPL: 1.3 G/DL
ALP SERPL-CCNC: 110 U/L (ref 39–117)
ALT SERPL W P-5'-P-CCNC: 17 U/L (ref 1–33)
ANION GAP SERPL CALCULATED.3IONS-SCNC: 12.6 MMOL/L (ref 5–15)
AST SERPL-CCNC: 22 U/L (ref 1–32)
BILIRUB SERPL-MCNC: 0.5 MG/DL (ref 0–1.2)
BUN SERPL-MCNC: 10 MG/DL (ref 6–20)
BUN/CREAT SERPL: 10.1 (ref 7–25)
CALCIUM SPEC-SCNC: 9.4 MG/DL (ref 8.6–10.5)
CHLORIDE SERPL-SCNC: 104 MMOL/L (ref 98–107)
CO2 SERPL-SCNC: 22.4 MMOL/L (ref 22–29)
CREAT SERPL-MCNC: 0.99 MG/DL (ref 0.57–1)
EGFRCR SERPLBLD CKD-EPI 2021: 79.8 ML/MIN/1.73
GLOBULIN UR ELPH-MCNC: 3 GM/DL
GLUCOSE SERPL-MCNC: 83 MG/DL (ref 65–99)
POTASSIUM SERPL-SCNC: 3.7 MMOL/L (ref 3.5–5.2)
PROT SERPL-MCNC: 7 G/DL (ref 6–8.5)
SODIUM SERPL-SCNC: 139 MMOL/L (ref 136–145)

## 2025-03-31 PROCEDURE — 80053 COMPREHEN METABOLIC PANEL: CPT

## 2025-04-04 ENCOUNTER — OFFICE VISIT (OUTPATIENT)
Dept: INTERNAL MEDICINE | Facility: CLINIC | Age: 30
End: 2025-04-04
Payer: COMMERCIAL

## 2025-04-04 VITALS
OXYGEN SATURATION: 99 % | HEART RATE: 98 BPM | WEIGHT: 208.38 LBS | SYSTOLIC BLOOD PRESSURE: 124 MMHG | DIASTOLIC BLOOD PRESSURE: 90 MMHG | BODY MASS INDEX: 38.11 KG/M2 | TEMPERATURE: 97.7 F | RESPIRATION RATE: 18 BRPM

## 2025-04-04 DIAGNOSIS — M79.602 LEFT ARM PAIN: ICD-10-CM

## 2025-04-04 DIAGNOSIS — I10 PRIMARY HYPERTENSION: ICD-10-CM

## 2025-04-04 DIAGNOSIS — M54.50 ACUTE LOW BACK PAIN WITHOUT SCIATICA, UNSPECIFIED BACK PAIN LATERALITY: Primary | ICD-10-CM

## 2025-04-04 PROCEDURE — 99214 OFFICE O/P EST MOD 30 MIN: CPT | Performed by: INTERNAL MEDICINE

## 2025-04-04 PROCEDURE — 1125F AMNT PAIN NOTED PAIN PRSNT: CPT | Performed by: INTERNAL MEDICINE

## 2025-04-04 RX ORDER — KETOROLAC TROMETHAMINE 30 MG/ML
60 INJECTION, SOLUTION INTRAMUSCULAR; INTRAVENOUS ONCE
Status: COMPLETED | OUTPATIENT
Start: 2025-04-04 | End: 2025-04-04

## 2025-04-04 RX ORDER — TRAMADOL HYDROCHLORIDE 50 MG/1
50 TABLET ORAL EVERY 6 HOURS PRN
Qty: 50 TABLET | Refills: 3 | Status: SHIPPED | OUTPATIENT
Start: 2025-04-04

## 2025-04-04 RX ADMIN — KETOROLAC TROMETHAMINE 60 MG: 30 INJECTION, SOLUTION INTRAMUSCULAR; INTRAVENOUS at 09:15

## 2025-04-04 NOTE — PROGRESS NOTES
"Chief Complaint  Hospital Follow Up Visit (Urgent care follow up)    Subjective    Virginia Pandey is a 29 y.o. female.     Virginia Pandey presents to [facility] for     History of Present Illness  The patient presents for evaluation of left-sided low back pain and left arm pain.    She recounts an incident where she experienced a fall at the veterinary clinic, resulting in injuries to her elbow, wrist, and hand on the lower left side. The fall was attributed to a recently mopped floor, which was not adequately dried or marked with a wet floor sign. She remained on the ground for approximately 10 minutes, unable to rise due to the slippery condition of the floor. Assistance was eventually provided by a friend who used a towel to facilitate their movement across the floor. The floor was made of cement. She was offered analgesics including Tylenol, ibuprofen, or Advil, and opted for Advil to manage her pain. An incident report was filed by the clinic staff, and she was informed that the clinical manager would discuss the incident with her.   Patient says that she believes that the vet clinic is at fault because the floor was wet and there where not any \"Caution\" wet signs posted in the hallway of the rooms.   Patient went to the urgent care because of her injuries     The following portions of the patient's history were reviewed and updated as appropriate: allergies, current medications, past family history, past medical history, past social history, past surgical history, and problem list.    Review of Systems   All other systems reviewed and are negative.      Objective   Body mass index is 38.11 kg/m².          Vital Signs:   /90 (BP Location: Right arm, Patient Position: Sitting, Cuff Size: Large Adult)   Pulse 98   Temp 97.7 °F (36.5 °C) (Temporal)   Resp 18   Wt 94.5 kg (208 lb 6 oz)   SpO2 99%   BMI 38.11 kg/m²       Physical Exam  The " patient is in mild distress due to pain.  The patient was able to ambulate slightly with a limp to the exam table. There is pain upon palpation in the lower lumbar region, but a reasonable range of motion, flexion, extension, rotation and movement is observed. +2 DTRs are noted.       Results              Assessment and Plan  Diagnoses and all orders for this visit:    Spent 30 minutes face to face with patient for medical decision making and management   Assessment & Plan  1. Acute onset of left-sided low back pain and left arm pain.  She is advised to continue with supportive care, including the use of a heating pad as needed and activity as tolerated. A prescription for tramadol 50 mg, to be taken 1 tablet by mouth every 6 hours as needed for pain management, has been provided. Additionally, a Toradol injection will be administered during this visit to alleviate her pain. If symptoms do not improve within 1 to 2 weeks, physical therapy will be considered.    2. Hypertension.  Her blood pressure is well-managed, even in light of her current injury. She is advised to maintain her current antihypertensive regimen.    PROCEDURE  A Toradol injection was administered during this visit to alleviate her pain.       Diagnoses and all orders for this visit:    1. Acute low back pain without sciatica, unspecified back pain laterality (Primary)  -     traMADol (ULTRAM) 50 MG tablet; Take 1 tablet by mouth Every 6 (Six) Hours As Needed for Moderate Pain.  Dispense: 50 tablet; Refill: 3  -     ketorolac (TORADOL) injection 60 mg    2. Left arm pain  -     traMADol (ULTRAM) 50 MG tablet; Take 1 tablet by mouth Every 6 (Six) Hours As Needed for Moderate Pain.  Dispense: 50 tablet; Refill: 3  -     ketorolac (TORADOL) injection 60 mg    3. Primary hypertension              Follow Up   No follow-ups on file.  Patient was given instructions and counseling regarding her condition or for health maintenance advice. Please see specific  information pulled into the AVS if appropriate.     Patient or patient representative verbalized consent for the use of Ambient Listening during the visit with  James Velez MD for chart documentation. 4/4/2025  08:39 EDT